# Patient Record
Sex: MALE | Race: BLACK OR AFRICAN AMERICAN | Employment: PART TIME | URBAN - METROPOLITAN AREA
[De-identification: names, ages, dates, MRNs, and addresses within clinical notes are randomized per-mention and may not be internally consistent; named-entity substitution may affect disease eponyms.]

---

## 2017-08-02 ENCOUNTER — OFFICE VISIT (OUTPATIENT)
Dept: FAMILY MEDICINE CLINIC | Age: 37
End: 2017-08-02

## 2017-08-02 ENCOUNTER — HOSPITAL ENCOUNTER (OUTPATIENT)
Dept: LAB | Age: 37
Discharge: HOME | End: 2017-08-02

## 2017-08-02 VITALS
TEMPERATURE: 98.2 F | WEIGHT: 315 LBS | DIASTOLIC BLOOD PRESSURE: 97 MMHG | BODY MASS INDEX: 44.1 KG/M2 | OXYGEN SATURATION: 97 % | SYSTOLIC BLOOD PRESSURE: 141 MMHG | HEIGHT: 71 IN | RESPIRATION RATE: 20 BRPM | HEART RATE: 93 BPM

## 2017-08-02 DIAGNOSIS — R74.8 ELEVATED SERUM ALKALINE PHOSPHATASE LEVEL: ICD-10-CM

## 2017-08-02 DIAGNOSIS — Z76.89 ENCOUNTER TO ESTABLISH CARE: ICD-10-CM

## 2017-08-02 DIAGNOSIS — Z76.89 ENCOUNTER TO ESTABLISH CARE: Primary | ICD-10-CM

## 2017-08-02 DIAGNOSIS — K21.9 GASTROESOPHAGEAL REFLUX DISEASE WITHOUT ESOPHAGITIS: ICD-10-CM

## 2017-08-02 DIAGNOSIS — E11.9 TYPE 2 DIABETES MELLITUS TREATED WITHOUT INSULIN (HCC): ICD-10-CM

## 2017-08-02 DIAGNOSIS — E78.1 HYPERTRIGLYCERIDEMIA: ICD-10-CM

## 2017-08-02 DIAGNOSIS — I10 HYPERTENSION WITH GOAL BLOOD PRESSURE LESS THAN 130/80: ICD-10-CM

## 2017-08-02 DIAGNOSIS — Z13.9 SCREENING PROCEDURE: ICD-10-CM

## 2017-08-02 DIAGNOSIS — E78.5 DYSLIPIDEMIA, GOAL LDL BELOW 70: ICD-10-CM

## 2017-08-02 DIAGNOSIS — Z78.9 ALCOHOL USE: ICD-10-CM

## 2017-08-02 DIAGNOSIS — G43.111 INTRACTABLE MIGRAINE WITH AURA WITH STATUS MIGRAINOSUS: ICD-10-CM

## 2017-08-02 LAB
ALBUMIN SERPL BCP-MCNC: 3.5 G/DL (ref 3.4–5)
ALBUMIN/GLOB SERPL: 0.8 {RATIO} (ref 0.8–1.7)
ALP SERPL-CCNC: 179 U/L (ref 45–117)
ALT SERPL-CCNC: 35 U/L (ref 16–61)
AMPHET UR QL SCN: NEGATIVE
ANION GAP BLD CALC-SCNC: 5 MMOL/L (ref 3–18)
AST SERPL W P-5'-P-CCNC: 21 U/L (ref 15–37)
BARBITURATES UR QL SCN: NEGATIVE
BASOPHILS # BLD AUTO: 0.1 K/UL (ref 0–0.06)
BASOPHILS # BLD: 1 % (ref 0–2)
BENZODIAZ UR QL: NEGATIVE
BILIRUB SERPL-MCNC: 0.3 MG/DL (ref 0.2–1)
BUN SERPL-MCNC: 11 MG/DL (ref 7–18)
BUN/CREAT SERPL: 10 (ref 12–20)
C TRACH RRNA SPEC QL NAA+PROBE: NEGATIVE
CALCIUM SERPL-MCNC: 8.9 MG/DL (ref 8.5–10.1)
CANNABINOIDS UR QL SCN: NEGATIVE
CHLORIDE SERPL-SCNC: 100 MMOL/L (ref 100–108)
CHOLEST SERPL-MCNC: 187 MG/DL
CO2 SERPL-SCNC: 31 MMOL/L (ref 21–32)
COCAINE UR QL SCN: NEGATIVE
CREAT SERPL-MCNC: 1.07 MG/DL (ref 0.6–1.3)
DIFFERENTIAL METHOD BLD: ABNORMAL
EOSINOPHIL # BLD: 0.1 K/UL (ref 0–0.4)
EOSINOPHIL NFR BLD: 1 % (ref 0–5)
ERYTHROCYTE [DISTWIDTH] IN BLOOD BY AUTOMATED COUNT: 12.4 % (ref 11.6–14.5)
EST. AVERAGE GLUCOSE BLD GHB EST-MCNC: 194 MG/DL
ETHANOL SERPL-MCNC: <3 MG/DL (ref 0–3)
GLOBULIN SER CALC-MCNC: 4.2 G/DL (ref 2–4)
GLUCOSE SERPL-MCNC: 273 MG/DL (ref 74–99)
HAV IGM SERPL QL IA: NEGATIVE
HBA1C MFR BLD: 8.4 % (ref 4.2–5.6)
HBV CORE IGM SER QL: NEGATIVE
HBV SURFACE AG SER QL: <0.1 INDEX
HBV SURFACE AG SER QL: NEGATIVE
HCT VFR BLD AUTO: 43.7 % (ref 36–48)
HCV AB SER IA-ACNC: 0.84 INDEX
HCV AB SERPL QL IA: ABNORMAL
HCV COMMENT,HCGAC: ABNORMAL
HDLC SERPL-MCNC: 48 MG/DL (ref 40–60)
HDLC SERPL: 3.9 {RATIO} (ref 0–5)
HDSCOM,HDSCOM: NORMAL
HGB BLD-MCNC: 14.2 G/DL (ref 13–16)
LDLC SERPL CALC-MCNC: 102 MG/DL (ref 0–100)
LIPID PROFILE,FLP: ABNORMAL
LYMPHOCYTES # BLD AUTO: 33 % (ref 21–52)
LYMPHOCYTES # BLD: 3.7 K/UL (ref 0.9–3.6)
MAGNESIUM SERPL-MCNC: 1.9 MG/DL (ref 1.6–2.6)
MCH RBC QN AUTO: 28.5 PG (ref 24–34)
MCHC RBC AUTO-ENTMCNC: 32.5 G/DL (ref 31–37)
MCV RBC AUTO: 87.6 FL (ref 74–97)
METHADONE UR QL: NEGATIVE
MONOCYTES # BLD: 0.7 K/UL (ref 0.05–1.2)
MONOCYTES NFR BLD AUTO: 6 % (ref 3–10)
N GONORRHOEA RRNA SPEC QL NAA+PROBE: NEGATIVE
NEUTS SEG # BLD: 6.9 K/UL (ref 1.8–8)
NEUTS SEG NFR BLD AUTO: 59 % (ref 40–73)
OPIATES UR QL: NEGATIVE
PCP UR QL: NEGATIVE
PLATELET # BLD AUTO: 270 K/UL (ref 135–420)
PMV BLD AUTO: 11.1 FL (ref 9.2–11.8)
POTASSIUM SERPL-SCNC: 4.1 MMOL/L (ref 3.5–5.5)
PROT SERPL-MCNC: 7.7 G/DL (ref 6.4–8.2)
RBC # BLD AUTO: 4.99 M/UL (ref 4.7–5.5)
RPR SER QL: NONREACTIVE
SODIUM SERPL-SCNC: 136 MMOL/L (ref 136–145)
SP1: ABNORMAL
SP2: ABNORMAL
SP3: ABNORMAL
SPECIMEN SOURCE: NORMAL
T3FREE SERPL-MCNC: 3.3 PG/ML (ref 2.3–4.2)
T4 FREE SERPL-MCNC: 1 NG/DL (ref 0.7–1.5)
TRIGL SERPL-MCNC: 185 MG/DL (ref ?–150)
TSH SERPL DL<=0.05 MIU/L-ACNC: 3.08 UIU/ML (ref 0.36–3.74)
VLDLC SERPL CALC-MCNC: 37 MG/DL
WBC # BLD AUTO: 11.4 K/UL (ref 4.6–13.2)

## 2017-08-02 PROCEDURE — 80053 COMPREHEN METABOLIC PANEL: CPT | Performed by: NURSE PRACTITIONER

## 2017-08-02 PROCEDURE — 80061 LIPID PANEL: CPT | Performed by: NURSE PRACTITIONER

## 2017-08-02 PROCEDURE — 80074 ACUTE HEPATITIS PANEL: CPT | Performed by: NURSE PRACTITIONER

## 2017-08-02 PROCEDURE — 80307 DRUG TEST PRSMV CHEM ANLYZR: CPT | Performed by: NURSE PRACTITIONER

## 2017-08-02 PROCEDURE — 84439 ASSAY OF FREE THYROXINE: CPT | Performed by: NURSE PRACTITIONER

## 2017-08-02 PROCEDURE — 84481 FREE ASSAY (FT-3): CPT | Performed by: NURSE PRACTITIONER

## 2017-08-02 PROCEDURE — 83036 HEMOGLOBIN GLYCOSYLATED A1C: CPT | Performed by: NURSE PRACTITIONER

## 2017-08-02 PROCEDURE — 87491 CHLMYD TRACH DNA AMP PROBE: CPT | Performed by: NURSE PRACTITIONER

## 2017-08-02 PROCEDURE — 86592 SYPHILIS TEST NON-TREP QUAL: CPT | Performed by: NURSE PRACTITIONER

## 2017-08-02 PROCEDURE — 85025 COMPLETE CBC W/AUTO DIFF WBC: CPT | Performed by: NURSE PRACTITIONER

## 2017-08-02 PROCEDURE — 84443 ASSAY THYROID STIM HORMONE: CPT | Performed by: NURSE PRACTITIONER

## 2017-08-02 PROCEDURE — 83735 ASSAY OF MAGNESIUM: CPT | Performed by: NURSE PRACTITIONER

## 2017-08-02 RX ORDER — LISINOPRIL AND HYDROCHLOROTHIAZIDE 12.5; 2 MG/1; MG/1
1 TABLET ORAL DAILY
Qty: 30 TAB | Refills: 1 | Status: SHIPPED | OUTPATIENT
Start: 2017-08-02 | End: 2017-10-16 | Stop reason: SDUPTHER

## 2017-08-02 RX ORDER — SIMVASTATIN 20 MG/1
TABLET, FILM COATED ORAL
COMMUNITY
End: 2017-08-14 | Stop reason: ALTCHOICE

## 2017-08-02 RX ORDER — FUROSEMIDE 20 MG/1
TABLET ORAL DAILY
COMMUNITY
End: 2017-08-02 | Stop reason: ALTCHOICE

## 2017-08-02 RX ORDER — AMITRIPTYLINE HYDROCHLORIDE 25 MG/1
25 TABLET, FILM COATED ORAL
Qty: 30 TAB | Refills: 3 | Status: SHIPPED | OUTPATIENT
Start: 2017-08-02 | End: 2017-12-04 | Stop reason: SDUPTHER

## 2017-08-02 RX ORDER — SPIRONOLACTONE 25 MG/1
25 TABLET ORAL 2 TIMES DAILY
COMMUNITY
End: 2017-08-02 | Stop reason: ALTCHOICE

## 2017-08-02 RX ORDER — DEXLANSOPRAZOLE 30 MG/1
30 CAPSULE, DELAYED RELEASE ORAL DAILY
Qty: 90 CAP | Refills: 0 | Status: SHIPPED | COMMUNITY
Start: 2017-08-02 | End: 2017-08-10 | Stop reason: RX

## 2017-08-02 NOTE — PATIENT INSTRUCTIONS
The [facility] reminders! Foundation Operating Hours: These may change without notice. Mon- Wed 7am to 5pm. Closed for lunch 12-1pm  Thurs 7am to 12pm  Fridays closed     Office number:     **In case of inclement weather (snow and ice) please do not try to come into the office for your appointment. Please call in and you will not be held as a [employer]. \" SAFETY FIRST!!**    NO SHOW POLICY ~ If a patient has 3 no shows for an appointment with the Provider, Mental Health Provider, or the Nurse Navigator, they will be discharged from the practice for 6 months. Medication ordering will also be suspended. If the patient is discharged from the [facility], they can go to the [place] [address] where they can be seen for their primary needs plus obtain the same type of medications as they received at the [facility]. To avoid being discharged the patient must call the office at [phone] 24 hours prior to their appointment if they need to cancel or reschedule, arrive to their appointments on time (preferrably 15 minutes early) and come to all scheduled appointments with the provider, mental health, and/or nurse navigator. If the patient is discharged from the practice, they can apply to be re-established after 6 months. Lab work:    Unless you are instructed differently, please return to the office between the hours of 7 am and 10:30 am Monday through Thursday to have your labs drawn one week before your next scheduled PROVIDER appointment. If you do not have an appointment to follow up on these results, please make one or plan to call the office if you do not hear from us to get the results. No news does not mean good news. Medications: [facility] or C will assist you with your medications when available.  Not all medications are available and may be obtained through local pharmacies such as [place] [address] that has a large $4 list.     If your medications are new or have changed, and you get your medications from the [address] [facility]), you MUST talk to the pharmacy staff to sign the new prescription applications. If you don't sign the applications we cannot get the medications for you. It usually takes 6-8 weeks for your medications to arrive. The Pharmacy staff will call you when your medications are available. You will have 30 days to come in and  your medications. If you don't  your medicines within those 30 days, those medicines may be placed on the self as samples and you will have to start all over again by completing the applications and waiting the 6-8 weeks for your medicines to arrive. Foot Care: Local ministry through [facility] ([address] [facility])  Every second Tuesday of the month (except for holidays and election days) from 9am to 1 pm. The services provided by these ministry volunteers are free of charge with the option to donate. They will inspect your feet thoroughly, soak them for 10 minutes, cut and file your nails. They care for diabetics as well. Keep in mind this service is free and will be on a first come first serve basis. Bad teeth? Ask about the Dental Clinic to get you in front of a local dentist when a dentist is available. The bus leaves every other Wednesday for those on the list. (Ask about availability as these appointments are limited). DIABETES:   Do you have uncontrolled diabetes or you just want to learn more about your diabetes? Schedule with the Nurse navigator for our new 5-week Diabetes program. You will learn how to properly manage your diabetes: nutrition, exercise, medication therapy. Eye exams for Diabetics. Please let us know so we can add you to the list to see the eye doctor at [facility]. These appointments are limited. You will receive a free eye exam and free glasses if needed. Unfortunately, if you are not a diabetic, we do not have a free service for eye exams for you (yet!).   We do have information on where to go to get a huge discount on eye exams and glasses. Sick visits: If you are sick and it is not an emergency call the office to schedule an appointment to see the provider. Charges and cost items from the Foundation:    Most of our orders are covered by [address] but there ARE SOME CHARGES for items such as radiology interpretations and anesthesiology during procedures and surgeries that are not covered under [facility]. Advanced Patient Advocacy (APA)   Please make sure you have contacted the APA group to check on your payment options: www. [email or web address]. APA is available Mon - Fri 8-4pm at [facility] on the first floor by the information desk. Their number is [phone]. It is important that you are screened in order to qualify for assistance and to avoid huge medical charges. The [facility] is not responsible for ANY charges you may accrue regardless of who ordered the medication, procedure, treatment or test. If you go to the Emergency Room, you WILL be charged! Behavior and emotional issues! It is stressful to be sick, have an illness, take medications, not have a job, not have medical insurance, have family issues or just getting older! Schedule an appointment with our mental health provider. She is in the office Mondays and Wednesdays from 8am to [address] [facility] can also contact the following: The national suicide hotline (6-551-370-IE or 6-863.689.6958)    [address], [address] [place] [address]) - [address] [place] [address], [facility] Shavonne Leone  [phone]    Drug and Alcohol Addiction Issues! It is hard to stop a poor habit but there is help out there.  Please feel free to attend any other the following support groups to help you kick the habit or go to [facility] to be evaluated by the psychiatric team. Never give up!! George meetings: Darnell Albarran, [address] 10:30 AM [place] [address] [facility] 8:00  [address] [facility]                Gastroesophageal Reflux Disease (GERD): Care Instructions  Your Care Instructions    Gastroesophageal reflux disease (GERD) is the backward flow of stomach acid into the esophagus. The esophagus is the tube that leads from your throat to your stomach. A one-way valve prevents the stomach acid from moving up into this tube. When you have GERD, this valve does not close tightly enough. If you have mild GERD symptoms including heartburn, you may be able to control the problem with antacids or over-the-counter medicine. Changing your diet, losing weight, and making other lifestyle changes can also help reduce symptoms. Follow-up care is a key part of your treatment and safety. Be sure to make and go to all appointments, and call your doctor if you are having problems. Its also a good idea to know your test results and keep a list of the medicines you take. How can you care for yourself at home? · Take your medicines exactly as prescribed. Call your doctor if you think you are having a problem with your medicine. · Your doctor may recommend over-the-counter medicine. For mild or occasional indigestion, antacids, such as Tums, Gaviscon, Mylanta, or Maalox, may help. Your doctor also may recommend over-the-counter acid reducers, such as Pepcid AC, Tagamet HB, Zantac 75, or Prilosec. Read and follow all instructions on the label. If you use these medicines often, talk with your doctor. · Change your eating habits. ¨ Its best to eat several small meals instead of two or three large meals. ¨ After you eat, wait 2 to 3 hours before you lie down. ¨ Chocolate, mint, and alcohol can make GERD worse.   ¨ Spicy foods, foods that have a lot of acid (like tomatoes and oranges), and coffee can make GERD symptoms worse in some people. If your symptoms are worse after you eat a certain food, you may want to stop eating that food to see if your symptoms get better. · Do not smoke or chew tobacco. Smoking can make GERD worse. If you need help quitting, talk to your doctor about stop-smoking programs and medicines. These can increase your chances of quitting for good. · If you have GERD symptoms at night, raise the head of your bed 6 to 8 inches by putting the frame on blocks or placing a foam wedge under the head of your mattress. (Adding extra pillows does not work.)  · Do not wear tight clothing around your middle. · Lose weight if you need to. Losing just 5 to 10 pounds can help. When should you call for help? Call your doctor now or seek immediate medical care if:  · You have new or different belly pain. · Your stools are black and tarlike or have streaks of blood. Watch closely for changes in your health, and be sure to contact your doctor if:  · Your symptoms have not improved after 2 days. · Food seems to catch in your throat or chest.  Where can you learn more? Go to http://judith-alex.info/. Enter O528 in the search box to learn more about \"Gastroesophageal Reflux Disease (GERD): Care Instructions. \"  Current as of: August 9, 2016  Content Version: 11.3  © 5040-3439 [employer]. Care instructions adapted under license by [employer] (which disclaims liability or warranty for this information). If you have questions about a medical condition or this instruction, always ask your healthcare professional. [place] [address] any warranty or liability for your use of this information. Dexlansoprazole (By mouth)   Dexlansoprazole (dex-lewis-REY-pra-zole)  Treats heartburn, gastroesophageal reflux disease (GERD), or a damaged esophagus. This medicine is a proton pump inhibitor (PPI).    Brand Name(s): Dexilant   There may be other brand names for this medicine. When This Medicine Should Not Be Used: This medicine is not right for everyone. Do not use it if you had an allergic reaction to dexlansoprazole. How to Use This Medicine:   Delayed Release Capsule, Tablet Disintegrating, Delayed Release  · Take this medicine as directed. You may need to take it for several weeks before you feel better. · Delayed-release capsule:   ¨ Swallow it whole. If you cannot swallow the capsule whole, you may open it and pour the medicine into a tablespoon of applesauce. Swallow the mixture right away without chewing. Do not store the mixed medicine for later use. ¨ If capsule is given through an oral syringe:   § Open the capsule and mix the contents with 20 milliliters (mL) of water. § Use an oral syringe to draw up all of the mixture and swirl gently. § Give the mixture right away. Do not chew. § Refill the syringe with 10 mL of water. Swirl gently. Swallow the water. § Refill the syringe with another 10 mL of water and swallow it right away. This will make sure you get your whole dose. ¨ If capsule is given through a feeding tube:   § Open the capsule and combine the medicine with 20 milliliters (mL) of water. § Use a catheter-tip syringe to draw up the mixture and swirl gently. § Inject the medicine into the feeding tube right away. § Refill the syringe with 10 mL of water. Swirl it gently. Inject the water into the tube. § Refill with another 10 mL of water and inject this into the tube. This will make sure the full dose is given. · Delayed-release disintegrating tablet:   ¨ Take this medicine at least 30 minutes before a meal.  ¨ Do not break or cut the tablet. ¨ Place the tablet on the tongue, allow to dissolve, and swallow it without water. Do not chew the microgranules. Or, you may swallow the tablet whole with water.   ¨ If tablet is given through an oral syringe:   § Place one tablet in an oral syringe and draw up 20 milliliters (mL) of water. § Swirl the syringe gently to keep the granules from settling. § Give the mixture directly into the mouth right away. Do not store the mixed medicine for later use. § To rinse any leftover medicine in the syringe, refill the syringe with 10 mL of water, swirl gently and swallow the water. § Repeat with an additional 10 mL of water. ¨ If tablet is given through a feeding tube:   § Place one tablet in a catheter-tip syringe and draw up 20 milliliters (mL) of water. § Shake gently to keep the granules from settling, and inject the medicine into the NG tube right away. § Refill the syringe with the 10 mL of water. § Shake it gently, and inject it into the tube to rinse any leftover medicine through the tube. § Repeat with an additional 10 mL of water. · This medicine should come with a Medication Guide. Ask your pharmacist for a copy if you do not have one. · Missed dose: Take a dose as soon as you remember. If it is almost time for your next dose, wait until then and take a regular dose. Do not take extra medicine to make up for a missed dose. · Store the medicine in a closed container at room temperature, away from heat, moisture, and direct light. Drugs and Foods to Avoid:   Ask your doctor or pharmacist before using any other medicine, including over-the-counter medicines, vitamins, and herbal products. · Do not use this medicine if you are also using products containing rilpivirine. · Dexlansoprazole affects digestion and may keep other medicines from working correctly.  Tell your doctor if you are using any of the following:  ¨ Ampicillin, atazanavir, dasatinib, digoxin, erlotinib, itraconazole, ketoconazole, methotrexate, mycophenolate mofetil, nelfinavir, nilotinib, rifampin, ritonavir, saquinavir, Solomon's wort, tacrolimus, voriconazole  ¨ Blood thinner (including warfarin)  ¨ Diuretic (water pill)  ¨ Iron supplements  · Do not drink alcohol while you are using this medicine. Warnings While Using This Medicine:   · Tell your doctor if you are pregnant or breastfeeding, or if you have kidney disease, liver disease, lupus, or osteoporosis. · This medicine may cause the following problems:  ¨ Kidney problems  ¨ Low vitamin B12 or magnesium levels in the blood  ¨ Increased risk of broken bones in the hip, wrist, or spine  · This medicine can cause diarrhea. Call your doctor if the diarrhea becomes severe, does not stop, or is bloody. Do not take any medicine to stop diarrhea until you have talked to your doctor. Diarrhea can occur 2 months or more after you stop taking this medicine. · Tell any doctor or dentist who treats you that you are using this medicine. This medicine may affect certain medical test results. · Keep all medicine out of the reach of children. Never share your medicine with anyone. Possible Side Effects While Using This Medicine:   Call your doctor right away if you notice any of these side effects:  · Allergic reaction: Itching or hives, swelling in your face or hands, swelling or tingling in your mouth or throat, chest tightness, trouble breathing  · Blistering, peeling, or red skin rash  · Fever, joint pain, swelling in your body, unusual weight gain, changes in how much or how often you urinate  · Joint pain, rash on your cheeks or arms that gets worse in the sun  · Seizures, dizziness, fast or uneven heartbeat, muscle cramps or twitching  · Severe diarrhea that does not go away, stomach cramps, fever  If you notice other side effects that you think are caused by this medicine, tell your doctor. Call your doctor for medical advice about side effects. You may report side effects to FDA at 8-320-FDA-2718  © 2017 [facility] Information is for End User's use only and may not be sold, redistributed or otherwise used for commercial purposes. The above information is an  only.  It is not intended as medical advice for individual conditions or treatments. Talk to your doctor, nurse or pharmacist before following any medical regimen to see if it is safe and effective for you.

## 2017-08-02 NOTE — PROGRESS NOTES
[facility]  [address] [facility]  [phone] office/[phone] fax      8/2/2017    Reason for visit:   Chief Complaint   Patient presents with    Establish Care    Hypertension     On aldactone and lasix    GERD     was on prilosec    Cholesterol Problem     on simvastatin       Patient: Marysol Westbrook, 1980, [id number]       Primary MD: Mimi Easton NP    Subjective:   Marysol Westbrook, a 39 y.o. male, who is right handed presents for Establish Care; Hypertension (On aldactone and lasix); GERD (was on prilosec); and Cholesterol Problem (on simvastatin)    I was released last month after being incarcerated for 3 years     Establish Care   The history is provided by the patient Helen Hammond MD for 3 years). Associated symptoms include headaches. Pertinent negatives include no chest pain and no shortness of breath. Hypertension    The history is provided by the patient (I took lisinopril/HCTZ for years and it worked. ). Chronicity: dx age 14 and startedmeds age 22. Episode onset: I love salt. I use light salt. I love potato chips. Associated symptoms include headaches. Pertinent negatives include no chest pain, no palpitations and no shortness of breath. GERD   The history is provided by the patient. Chronicity: Dx 10 yrs ago. taking prilosec I like Hot sauce , peppers. Associated symptoms include headaches. Pertinent negatives include no chest pain and no shortness of breath. Cholesterol Problem   The history is provided by the patient (simvastatin now). Associated symptoms include headaches. Pertinent negatives include no chest pain and no shortness of breath. Medication Evaluation   The history is provided by the patient (was on Depakote that made me swell, then he added aldactone on top of prinzide/HCTZ. currently taking spirolactolone,topiramate,simvastatin,furosemide,omeprazole). Associated symptoms include headaches.  Pertinent negatives include no chest pain and no shortness of breath. Headache   The history is provided by the patient (migraines almost everyday. I was taking Topamax- so expensive. I cant afford. Shagufta Alida ). Associated symptoms include headaches. Pertinent negatives include no chest pain and no shortness of breath. Past Medical History:   Diagnosis Date    GERD (gastroesophageal reflux disease)     H/O seasonal allergies     Hypertension     Migraine        History reviewed. No pertinent surgical history. Social History     Social History    Marital status: SINGLE     Spouse name: N/A    Number of children: N/A    Years of education: 15     Occupational History    unemployed      Social History Main Topics    Smoking status: Former Smoker    Smokeless tobacco: Never Used      Comment: e- cigarette user    Alcohol use Yes      Comment: occasional    Drug use: No    Sexual activity: Yes     Partners: Female     Birth control/ protection: None     Other Topics Concern     Service No    Blood Transfusions No    Occupational Exposure No    Hobby Hazards No    Sleep Concern No    Weight Concern Yes    Exercise Yes     walking    Bike Helmet No    Seat Belt Yes    Self-Exams No     Social History Narrative    No narrative on file       No Known Allergies    No current outpatient prescriptions on file prior to visit. No current facility-administered medications on file prior to visit. Review of Systems   Constitutional: Negative. Eyes: Negative. Respiratory: Negative for cough, shortness of breath and wheezing. Smoke e-cigs. Cardiovascular: Negative for chest pain, palpitations and leg swelling. Gastrointestinal:        I get reflux at times. I take prilosec as needed   Endocrine: Negative. Genitourinary:        I kept getting UTI x3 while incarcerated. Musculoskeletal: Negative. Skin: Negative. Allergic/Immunologic: Negative.     Neurological: Positive for headaches. Hematological: Negative. Psychiatric/Behavioral:        No illicit drugs. I drink 2-3 beers twice a week. Objective:   Visit Vitals    BP (!) 141/97 (BP 1 Location: Left arm, BP Patient Position: Sitting)  Comment: took BP med 1.5 hr ago    Pulse 93    Temp 98.2 °F (36.8 °C) (Oral)    Resp 20    Ht 5' 11\" (1.803 m)    Wt 320 lb (145.2 kg)    SpO2 97%    BMI 44.63 kg/m2      Wt Readings from Last 3 Encounters:   08/02/17 320 lb (145.2 kg)     No results found for: GLU, GLUCPOC      Physical Exam      Assessment:    Dimitrios Keen who has risk factors including (see above previous medical hx) and:       ICD-10-CM ICD-9-CM    1. Encounter to establish care Z76.89 V65.8 CBC WITH AUTOMATED DIFF      CHLAMYDIA/NEISSERIA AMPLIFICATION      DRUG SCREEN, URINE      ETHYL ALCOHOL      HEMOGLOBIN A1C WITH EAG      HEPATITIS PANEL, ACUTE      LIPID PANEL      TSH 3RD GENERATION      T4, FREE      T3, FREE      RPR      METABOLIC PANEL, COMPREHENSIVE      MAGNESIUM   2. Hypertension with goal blood pressure less than 130/80 I10 401.9 lisinopril-hydroCHLOROthiazide (PRINZIDE, ZESTORETIC) 20-12.5 mg per tablet   3. Gastroesophageal reflux disease without esophagitis K21.9 530.81 dexlansoprazole (DEXILANT) 30 mg capsule   4. Intractable migraine with aura with status migrainosus G43. 111 346.03 amitriptyline (ELAVIL) 25 mg tablet     1. Encounter to establish care    - CBC WITH AUTOMATED DIFF; Future  - CHLAMYDIA/NEISSERIA AMPLIFICATION; Future  - DRUG SCREEN, URINE; Future  - ETHYL ALCOHOL; Future  - HEMOGLOBIN A1C WITH EAG; Future  - HEPATITIS PANEL, ACUTE; Future  - LIPID PANEL; Future  - TSH 3RD GENERATION; Future  - T4, FREE; Future  - T3, FREE; Future  - RPR; Future  - METABOLIC PANEL, COMPREHENSIVE; Future  - MAGNESIUM; Future    2. Hypertension with goal blood pressure less than 130/80  See dyslipidemia section.     - lisinopril-hydroCHLOROthiazide (PRINZIDE, ZESTORETIC) 20-12.5 mg per tablet; Take 1 Tab by mouth daily. Dispense: 30 Tab; Refill: 1    3. Dyslipidemia, goal LDL below 70  May add caduet tomed regimen for BP and cholesterol if appropriate. If caduet is appropriate then pt will dc zocor. NN to check BP at follow up appt and let me know results. 4. Hypertriglyceridemia    - omega-3 acid ethyl esters (LOVAZA) 1 gram capsule; Take 2 Caps by mouth two (2) times a day. For triglycerides  Dispense: 360 Cap; Refill: 3  - omega-3 acid ethyl esters (LOVAZA) 1 gram capsule; Take 2 Caps by mouth two (2) times a day. For triglycerides  Dispense: 360 Cap; Refill: 0    5. Type 2 diabetes mellitus treated without insulin (HCC)  Newly dx DM. Pt to set up with NN for 5 week DM program.     - sAXagliptin-metFORMIN (KOMBIGLYZE XR) 2.5-1,000 mg TM24; Take 1 Tab by mouth daily. For diabetes  Dispense: 90 Tab; Refill: 0  - sAXagliptin-metFORMIN (KOMBIGLYZE XR) 2.5-1,000 mg TM24; Take 1 Tab by mouth daily. For diabetes  Dispense: 90 Tab; Refill: 3  - REFERRAL TO OPTOMETRY    6. Gastroesophageal reflux disease without esophagitis  Instructed pt to avoid foods that may stir up the reflux such as hot, spicy foods. Avoid laying down for 45 minutes after eating a meal.  Decrease the caffeine, acidic foods/drinks, elimination of alcohol and tobacco products. Discussed GERD treatment and the goal of it being a temporary measure while the patient improves their diet due to the risk of persistent GERD causing Munoz's esophagus which is precancerous and can cause osteoporosis. Patient verbalized understanding and will work on diet. This medication is provided on a temporary basis and advised of the risk of low magnesium which may cause muscle cramping and risk of osteoporosis and hypokalemia. - dexlansoprazole (DEXILANT) 30 mg capsule; Take 1 Cap by mouth daily. Dispense: 90 Cap; Refill: 0    7. Intractable migraine with aura with status migrainosus  Pt unable to afford topamax.  Elavil effective in the past.     - amitriptyline (ELAVIL) 25 mg tablet; Take 1 Tab by mouth nightly. For migraine prevention  Dispense: 30 Tab; Refill: 3    8. BMI 40.0-44.9, adult ([address]      9. Screening procedure  Pts Hep C panel came back as equivocal. Will obtain PCR.     - HEPATITIS C QT BY PCR WITH REFLEX GENOTYPE; Future    10. Elevated serum alkaline phosphatase level  Possibly alcohol induced    11. Alcohol use      Written instructions followed our verbal discussion of all information discussed above, pending tests ordered and future goals/plans. Patient expressed understanding of current diagnosis, planned testing, follow up and if needed to contact the office for any questions or concerns prior to the next visit. Plan:   Reviewed medication and completed the medication reconciliation with the patient. Reviewed side effects of medications with the patient. Questions were answered and patient verb understanding. Labs obtained to establish baseline, evaluate metabolic health, nutritional status, vitamin deficiencies and screening for at risk items based on the demographics of the patient, previous medical history and current social practices.  Will contact the patient in when all labs are resulted by phone to review and make lifestyle and medication recommendations. Follow up labs will be completed to monitor improvement prior to their next visit. LAB UPDATE:   Karen,  Please notify this new patient of the following lab results: Appt 8/9/17  1) Hep C is equivocal. Pt needs to have Hep with pcr drawn. Please draw this lab when you see him for his appt  2) A1c 8.4. Will order Kombiglyze via [facility] as sample and as a program medication. Pt needs to come into the office to sign [facility] paperwork and to  samples if available. Pt needs to schedule with you for the 5 week DM program as well. 3) . Will order Lovaza via [facility] as sample and as a program medication.  Pt needs to come into the office to sign [facility] paperwork and to  samples if available; . Let me know what pts BP is when you check it. May add caduet to him for BP and cholesterol. If caduet is appropriate then pt will dc zocor   4) Alk phos slightly elevated. Encourage pt to cut back on alcohol    Please inform patient they will need labs at 3 follow up appt    Orders Placed This Encounter    CBC WITH AUTOMATED DIFF     Standing Status:   Future     Standing Expiration Date:   8/2/2017    CHLAMYDIA/NEISSERIA AMPLIFICATION     Standing Status:   Future     Standing Expiration Date:   8/2/2017     Order Specific Question:   Specimen type/source     Answer:   Urine [258]    DRUG SCREEN, URINE     Standing Status:   Future     Standing Expiration Date:   8/2/2017    ETHYL ALCOHOL     Standing Status:   Future     Standing Expiration Date:   8/2/2017    HEMOGLOBIN A1C WITH EAG     Standing Status:   Future     Standing Expiration Date:   8/2/2017    HEPATITIS PANEL, ACUTE     Standing Status:   Future     Standing Expiration Date:   8/2/2017    LIPID PANEL     Standing Status:   Future     Standing Expiration Date:   8/2/2017    TSH 3RD GENERATION     Standing Status:   Future     Standing Expiration Date:   8/2/2017    T4, FREE     Standing Status:   Future     Standing Expiration Date:   8/2/2017    T3, FREE     Standing Status:   Future     Standing Expiration Date:   8/2/2017    RPR     Standing Status:   Future     Standing Expiration Date:   3/4/7374    METABOLIC PANEL, COMPREHENSIVE     Standing Status:   Future     Standing Expiration Date:   8/2/2017    MAGNESIUM     Standing Status:   Future     Standing Expiration Date:   8/2/2017    DISCONTD: furosemide (LASIX) 20 mg tablet     Sig: Take  by mouth daily.  DISCONTD: spironolactone (ALDACTONE) 25 mg tablet     Sig: Take 25 mg by mouth two (2) times a day.  simvastatin (ZOCOR) 20 mg tablet     Sig: Take  by mouth nightly.     lisinopril-hydroCHLOROthiazide (Aura Job) 20-12.5 mg per tablet     Sig: Take 1 Tab by mouth daily. Dispense:  30 Tab     Refill:  1    dexlansoprazole (DEXILANT) 30 mg capsule     Sig: Take 1 Cap by mouth daily. Dispense:  90 Cap     Refill:  0    amitriptyline (ELAVIL) 25 mg tablet     Sig: Take 1 Tab by mouth nightly. For migraine prevention     Dispense:  30 Tab     Refill:  3     Current Outpatient Prescriptions   Medication Sig Dispense Refill    simvastatin (ZOCOR) 20 mg tablet Take  by mouth nightly.  lisinopril-hydroCHLOROthiazide (PRINZIDE, ZESTORETIC) 20-12.5 mg per tablet Take 1 Tab by mouth daily. 30 Tab 1    dexlansoprazole (DEXILANT) 30 mg capsule Take 1 Cap by mouth daily. 90 Cap 0    amitriptyline (ELAVIL) 25 mg tablet Take 1 Tab by mouth nightly. For migraine prevention 30 Tab 3       Follow-up Disposition:  Return in about 3 months (around 11/2/2017). Call [facility] for financial assistance      \"No Show policy was reviewed with the patient. The services affected are the nurse navigator and the provider. No show appointments include missing labs for a future scheduled appointment, Pap/pelvics, arriving to appointment more than 10 minutes late, and calling to cancel appointment less than 24 hours in advance. After the 3rd No Show, the patient will be removed from the Foundation to include medications for 6 months. The patient will be referred to the [place] [id number] for their primary care needs. \"     pt to schedule an appt in 1 week with NN to discuss expectations, health issues, and lab review. Yisel Whipple RN, MSN, [employer]       spent 60 minutes with the patient in face-to-face consultation, of which greater than 50% was spent in counseling and coordination of care as described above.

## 2017-08-02 NOTE — MR AVS SNAPSHOT
Visit Information Date & Time Provider Department Dept. Phone Encounter #  
 8/2/2017  9:00 AM Bill Rose NP [address] [facility] [address] [id number] Follow-up Instructions Return in about 3 months (around 11/2/2017). Your Appointments 8/9/2017  9:30 AM  
CONSULT with NURSE NAVIGATOR [facility] [address] [facility] ([address]) Appt Note: NPO/BP check see note [address] [facility] [address]  
[address] [facility] [address]  
  
    
 11/8/2017  1:00 PM  
Follow Up with Bill Rose NP [address] [facility] ([address]) Appt Note: 3 mth follow up  
 [address] [facility] [address]  
[address] [facility] [address] Upcoming Health Maintenance Date Due DTaP/Tdap/Td series (1 - Tdap) 12/13/2000 INFLUENZA AGE 9 TO ADULT 8/1/2017 Allergies as of 8/2/2017  Review Complete On: 8/2/2017 By: Shayla Mooney. Yeimy Wheeler LPN No Known Allergies Current Immunizations  Never Reviewed No immunizations on file. Not reviewed this visit You Were Diagnosed With   
  
 Codes Comments Encounter to establish care    -  Primary ICD-10-CM: Z76.89 
ICD-9-CM: V65.8 Hypertension with goal blood pressure less than 130/80     ICD-10-CM: I10 
ICD-9-CM: 401.9 Gastroesophageal reflux disease without esophagitis     ICD-10-CM: K21.9 ICD-9-CM: 530.81 Intractable migraine with aura with status migrainosus     ICD-10-CM: G43.111 ICD-9-CM: 346.03 Vitals BP Pulse Temp Resp Height(growth percentile) Weight(growth percentile) (!) 141/97 (BP 1 Location: Left arm, BP Patient Position: Sitting) 93 98.2 °F (36.8 °C) (Oral) 20 5' 11\" (1.803 m) 320 lb (145.2 kg) SpO2 BMI Smoking Status 97% 44.63 kg/m2 Former Smoker BMI and BSA Data Body Mass Index Body Surface Area  
 44.63 kg/m 2 2.7 m 2 Preferred Pharmacy Pharmacy Name Phone [facility] Marli Doran [address]. [phone] Your Updated Medication List  
  
   
This list is accurate as of: 8/2/17  9:28 AM.  Always use your most recent med list.  
  
  
  
  
 amitriptyline 25 mg tablet Commonly known as:  ELAVIL Take 1 Tab by mouth nightly. For migraine prevention  
  
 dexlansoprazole 30 mg capsule Commonly known as:  DEXILANT Take 1 Cap by mouth daily. lisinopril-hydroCHLOROthiazide 20-12.5 mg per tablet Commonly known as:  Marjean Amaya Take 1 Tab by mouth daily. simvastatin 20 mg tablet Commonly known as:  ZOCOR Take  by mouth nightly. Prescriptions Sent to Pharmacy Refills  
 lisinopril-hydroCHLOROthiazide (PRINZIDE, ZESTORETIC) 20-12.5 mg per tablet 1 Sig: Take 1 Tab by mouth daily. Class: Normal  
 Pharmacy: [address] [place] [address] Lakeisha Morel [address]. Ph #: [phone] Route: Oral  
 amitriptyline (ELAVIL) 25 mg tablet 3 Sig: Take 1 Tab by mouth nightly. For migraine prevention Class: Normal  
 Pharmacy: [address] [place] [address] Lakeisha Morel [address]. Ph #: [phone] Route: Oral  
  
Follow-up Instructions Return in about 3 months (around 11/2/2017). Patient Instructions [facility] reminders! Foundation Operating Hours: These may change without notice. Mon- Wed 7am to 5pm. Closed for lunch 12-1pm 
Thurs 7am to 12pm 
Fridays closed Office number:  **In case of inclement weather (snow and ice) please do not try to come into the office for your appointment. Please call in and you will not be held as a [employer] \" SAFETY FIRST!!** 
 
NO SHOW POLICY ~ If a patient has 3 no shows for an appointment with the Provider, Mental Health Provider, or the Nurse Navigator, they will be discharged from the practice for 6 months. Medication ordering will also be suspended. If the patient is discharged from the [place], they can go to the [place] [address] where they can be seen for their primary needs plus obtain the same type of medications as they received at the [place]. To avoid being discharged the patient must call the office at [phone] 24 hours prior to their appointment if they need to cancel or reschedule, arrive to their appointments on time (preferrably 15 minutes early) and come to all scheduled appointments with the provider, mental health, and/or nurse navigator. If the patient is discharged from the practice, they can apply to be re-established after 6 months. Lab work:   
Unless you are instructed differently, please return to the office between the hours of 7 am and 10:30 am Monday through Thursday to have your labs drawn one week before your next scheduled PROVIDER appointment. If you do not have an appointment to follow up on these results, please make one or plan to call the office if you do not hear from us to get the results. No news does not mean good news. Medications: [facility] or [facility] will assist you with your medications when available. Not all medications are available and may be obtained through local pharmacies such as [place] [address] that has a large $4 list.  
 
If your medications are new or have changed, and you get your medications from the [address] [place] [address]), you MUST talk to the pharmacy staff to sign the new prescription applications. If you don't sign the applications we cannot get the medications for you. It usually takes 6-8 weeks for your medications to arrive. The Pharmacy staff will call you when your medications are available. You will have 30 days to come in and  your medications.  If you don't  your medicines within those 30 days, those medicines may be placed on the self as samples and you will have to start all over again by completing the applications and waiting the 6-8 weeks for your medicines to arrive. Foot Care: [facility] through [facility] ([address]) Every second Tuesday of the month (except for holidays and election days) from 9am to 1 pm. The services provided by these ministry volunteers are free of charge with the option to donate. They will inspect your feet thoroughly, soak them for 10 minutes, cut and file your nails. They care for diabetics as well. Keep in mind this service is free and will be on a first come first serve basis. Bad teeth? Ask about the Dental Clinic to get you in front of a local dentist when a dentist is available. The bus leaves every other Wednesday for those on the list. (Ask about availability as these appointments are limited). DIABETES:  
Do you have uncontrolled diabetes or you just want to learn more about your diabetes? Schedule with the Nurse navigator for our new 5-week Diabetes program. You will learn how to properly manage your diabetes: nutrition, exercise, medication therapy. Eye exams for Diabetics. Please let us know so we can add you to the list to see the eye doctor at [facility]. These appointments are limited. You will receive a free eye exam and free glasses if needed. Unfortunately, if you are not a diabetic, we do not have a free service for eye exams for you (yet!). We do have information on where to go to get a huge discount on eye exams and glasses. Sick visits: If you are sick and it is not an emergency call the office to schedule an appointment to see the provider. Charges and cost items from the Foundation: Most of our orders are covered by [employer] but there ARE SOME CHARGES for items such as radiology interpretations and anesthesiology during procedures and surgeries that are not covered under [employer]. Advanced Patient Advocacy (APA) Please make sure you have contacted the APA group to check on your payment options: [email or web address]. APA is available Mon - Fri 8-4pm at [facility] on the first floor by the information desk. Their number is [phone]. It is important that you are screened in order to qualify for assistance and to avoid huge medical charges. The [facility] is not responsible for ANY charges you may accrue regardless of who ordered the medication, procedure, treatment or test. If you go to the Emergency Room, you WILL be charged! Behavior and emotional issues! It is stressful to be sick, have an illness, take medications, not have a job, not have medical insurance, have family issues or just getting older! Schedule an appointment with our mental health provider. She is in the office Mondays and Wednesdays from 8am to [place] [address] can also contact the following: The national suicide hotline (5-752-446-MIZP or 6-244.700.3012) [address] [place] [address] 
[phone] [facility] ([facility]) [facility] [address] [place] [address] [facility]  
[phone] Drug and Alcohol Addiction Issues! It is hard to stop a poor habit but there is help out there. Please feel free to attend any other the following support groups to help you kick the habit or go to [facility] Emergency Department to be evaluated by the psychiatric team. Never give up!! George meetings: Cathy Ojeda [place] MONDAY 10:30  Radha [address] [place] SATURDAY 8:00 PM RUTH [facility] SATURDAY NIGHT KHADRA Jose [address] Gastroesophageal Reflux Disease (GERD): Care Instructions Your Care Instructions Gastroesophageal reflux disease (GERD) is the backward flow of stomach acid into the esophagus. The esophagus is the tube that leads from your throat to your stomach. A one-way valve prevents the stomach acid from moving up into this tube. When you have GERD, this valve does not close tightly enough. If you have mild GERD symptoms including heartburn, you may be able to control the problem with antacids or over-the-counter medicine. Changing your diet, losing weight, and making other lifestyle changes can also help reduce symptoms. Follow-up care is a key part of your treatment and safety. Be sure to make and go to all appointments, and call your doctor if you are having problems. Its also a good idea to know your test results and keep a list of the medicines you take. How can you care for yourself at home? · Take your medicines exactly as prescribed. Call your doctor if you think you are having a problem with your medicine. · Your doctor may recommend over-the-counter medicine. For mild or occasional indigestion, antacids, such as Tums, Gaviscon, Mylanta, or Maalox, may help. Your doctor also may recommend over-the-counter acid reducers, such as Pepcid AC, Tagamet HB, Zantac 75, or Prilosec. Read and follow all instructions on the label. If you use these medicines often, talk with your doctor. · Change your eating habits. ¨ Its best to eat several small meals instead of two or three large meals. ¨ After you eat, wait 2 to 3 hours before you lie down. ¨ Chocolate, mint, and alcohol can make GERD worse. ¨ Spicy foods, foods that have a lot of acid (like tomatoes and oranges), and coffee can make GERD symptoms worse in some people. If your symptoms are worse after you eat a certain food, you may want to stop eating that food to see if your symptoms get better. · Do not smoke or chew tobacco. Smoking can make GERD worse.  If you need help quitting, talk to your doctor about stop-smoking programs and medicines. These can increase your chances of quitting for good. · If you have GERD symptoms at night, raise the head of your bed 6 to 8 inches by putting the frame on blocks or placing a foam wedge under the head of your mattress. (Adding extra pillows does not work.) · Do not wear tight clothing around your middle. · Lose weight if you need to. Losing just 5 to 10 pounds can help. When should you call for help? Call your doctor now or seek immediate medical care if: 
· You have new or different belly pain. · Your stools are black and tarlike or have streaks of blood. Watch closely for changes in your health, and be sure to contact your doctor if: 
· Your symptoms have not improved after 2 days. · Food seems to catch in your throat or chest. 
Where can you learn more? Go to http://judith-alex.info/. Enter J077 in the search box to learn more about \"Gastroesophageal Reflux Disease (GERD): Care Instructions. \" Current as of: August 9, 2016 Content Version: 11.3 © 2706-4993 [employer]. Care instructions adapted under license by [employer] (which disclaims liability or warranty for this information). If you have questions about a medical condition or this instruction, always ask your healthcare professional. [address] any warranty or liability for your use of this information. Dexlansoprazole (By mouth) Dexlansoprazole (dex-lewis-REY-pra-zole) Treats heartburn, gastroesophageal reflux disease (GERD), or a damaged esophagus. This medicine is a proton pump inhibitor (PPI). Brand Name(s): Luis M Bryant There may be other brand names for this medicine. When This Medicine Should Not Be Used: This medicine is not right for everyone. Do not use it if you had an allergic reaction to dexlansoprazole. How to Use This Medicine:  
Delayed Release Capsule, Tablet Disintegrating, Delayed Release · Take this medicine as directed. You may need to take it for several weeks before you feel better. · Delayed-release capsule: ¨ Swallow it whole. If you cannot swallow the capsule whole, you may open it and pour the medicine into a tablespoon of applesauce. Swallow the mixture right away without chewing. Do not store the mixed medicine for later use. ¨ If capsule is given through an oral syringe:  
§ Open the capsule and mix the contents with 20 milliliters (mL) of water. § Use an oral syringe to draw up all of the mixture and swirl gently. § Give the mixture right away. Do not chew. § Refill the syringe with 10 mL of water. Swirl gently. Swallow the water. § Refill the syringe with another 10 mL of water and swallow it right away. This will make sure you get your whole dose. ¨ If capsule is given through a feeding tube:  
§ Open the capsule and combine the medicine with 20 milliliters (mL) of water. § Use a catheter-tip syringe to draw up the mixture and swirl gently. § Inject the medicine into the feeding tube right away. § Refill the syringe with 10 mL of water. Swirl it gently. Inject the water into the tube. § Refill with another 10 mL of water and inject this into the tube. This will make sure the full dose is given. · Delayed-release disintegrating tablet:  
¨ Take this medicine at least 30 minutes before a meal. 
¨ Do not break or cut the tablet. ¨ Place the tablet on the tongue, allow to dissolve, and swallow it without water. Do not chew the microgranules. Or, you may swallow the tablet whole with water. ¨ If tablet is given through an oral syringe: § Place one tablet in an oral syringe and draw up 20 milliliters (mL) of water. § Swirl the syringe gently to keep the granules from settling. § Give the mixture directly into the mouth right away. Do not store the mixed medicine for later use.  
§ To rinse any leftover medicine in the syringe, refill the syringe with 10 mL of water, swirl gently and swallow the water. § Repeat with an additional 10 mL of water. ¨ If tablet is given through a feeding tube: § Place one tablet in a catheter-tip syringe and draw up 20 milliliters (mL) of water. § Shake gently to keep the granules from settling, and inject the medicine into the NG tube right away. § Refill the syringe with the 10 mL of water. § Shake it gently, and inject it into the tube to rinse any leftover medicine through the tube. § Repeat with an additional 10 mL of water. · This medicine should come with a Medication Guide. Ask your pharmacist for a copy if you do not have one. · Missed dose: Take a dose as soon as you remember. If it is almost time for your next dose, wait until then and take a regular dose. Do not take extra medicine to make up for a missed dose. · Store the medicine in a closed container at room temperature, away from heat, moisture, and direct light. Drugs and Foods to Avoid: Ask your doctor or pharmacist before using any other medicine, including over-the-counter medicines, vitamins, and herbal products. · Do not use this medicine if you are also using products containing rilpivirine. · Dexlansoprazole affects digestion and may keep other medicines from working correctly. Tell your doctor if you are using any of the following: ¨ Ampicillin, atazanavir, dasatinib, digoxin, erlotinib, itraconazole, ketoconazole, methotrexate, mycophenolate mofetil, nelfinavir, nilotinib, rifampin, ritonavir, saquinavir, Solomon's wort, tacrolimus, voriconazole ¨ Blood thinner (including warfarin) ¨ Diuretic (water pill) ¨ Iron supplements · Do not drink alcohol while you are using this medicine. Warnings While Using This Medicine: · Tell your doctor if you are pregnant or breastfeeding, or if you have kidney disease, liver disease, lupus, or osteoporosis. · This medicine may cause the following problems: ¨ Kidney problems ¨ Low vitamin B12 or magnesium levels in the blood ¨ Increased risk of broken bones in the hip, wrist, or spine · This medicine can cause diarrhea. Call your doctor if the diarrhea becomes severe, does not stop, or is bloody. Do not take any medicine to stop diarrhea until you have talked to your doctor. Diarrhea can occur 2 months or more after you stop taking this medicine. · Tell any doctor or dentist who treats you that you are using this medicine. This medicine may affect certain medical test results. · Keep all medicine out of the reach of children. Never share your medicine with anyone. Possible Side Effects While Using This Medicine:  
Call your doctor right away if you notice any of these side effects: · Allergic reaction: Itching or hives, swelling in your face or hands, swelling or tingling in your mouth or throat, chest tightness, trouble breathing · Blistering, peeling, or red skin rash · Fever, joint pain, swelling in your body, unusual weight gain, changes in how much or how often you urinate · Joint pain, rash on your cheeks or arms that gets worse in the sun · Seizures, dizziness, fast or uneven heartbeat, muscle cramps or twitching · Severe diarrhea that does not go away, stomach cramps, fever If you notice other side effects that you think are caused by this medicine, tell your doctor. Call your doctor for medical advice about side effects. You may report side effects to FDA at 6-469-FDA-6802 © 2017 [facility] Information is for End User's use only and may not be sold, redistributed or otherwise used for commercial purposes. The above information is an  only. It is not intended as medical advice for individual conditions or treatments. Talk to your doctor, nurse or pharmacist before following any medical regimen to see if it is safe and effective for you. Introducing [facility]! [facility] introduces [employer] patient portal. Now you can access parts of your medical record, email your doctor's office, and request medication refills online. 1. In your internet browser, go to [email or web address] 2. Click on the First Time User? Click Here link in the Sign In box. You will see the New Member Sign Up page. 3. Enter your [employer] Access Code exactly as it appears below. You will not need to use this code after youve completed the sign-up process. If you do not sign up before the expiration date, you must request a new code. · [employer] Access Code: [email or web address] Expires: 10/31/2017  8:27 AM 
 
4. Enter the last four digits of your Social Security Number (xxxx) and Date of Birth (mm/dd/yyyy) as indicated and click Submit. You will be taken to the next sign-up page. 5. Create a [employer] ID. This will be your [employer] login ID and cannot be changed, so think of one that is secure and easy to remember. 6. Create a [employer] password. You can change your password at any time. 7. Enter your Password Reset Question and Answer. This can be used at a later time if you forget your password. 8. Enter your e-mail address. You will receive e-mail notification when new information is available in [address] 9. Click Sign Up. You can now view and download portions of your medical record. 10. Click the Download Summary menu link to download a portable copy of your medical information. If you have questions, please visit the Frequently Asked Questions section of the [employer] website. Remember, [employer] is NOT to be used for urgent needs. For medical emergencies, dial 911. Now available from your iPhone and Android! Please provide this summary of care documentation to your next provider. Your primary care clinician is listed as Catana Cloud. If you have any questions after today's visit, please call [phone].

## 2017-08-07 PROBLEM — E11.9 TYPE 2 DIABETES MELLITUS TREATED WITHOUT INSULIN (HCC): Status: ACTIVE | Noted: 2017-08-07

## 2017-08-07 PROBLEM — Z78.9 ALCOHOL USE: Status: ACTIVE | Noted: 2017-08-07

## 2017-08-07 PROBLEM — I10 HYPERTENSION WITH GOAL BLOOD PRESSURE LESS THAN 130/80: Status: ACTIVE | Noted: 2017-08-07

## 2017-08-07 PROBLEM — E78.1 HYPERTRIGLYCERIDEMIA: Status: ACTIVE | Noted: 2017-08-07

## 2017-08-07 PROBLEM — G43.111 INTRACTABLE MIGRAINE WITH AURA WITH STATUS MIGRAINOSUS: Status: ACTIVE | Noted: 2017-08-07

## 2017-08-07 PROBLEM — R74.8 ELEVATED SERUM ALKALINE PHOSPHATASE LEVEL: Status: ACTIVE | Noted: 2017-08-07

## 2017-08-07 PROBLEM — K21.9 GASTROESOPHAGEAL REFLUX DISEASE WITHOUT ESOPHAGITIS: Status: ACTIVE | Noted: 2017-08-07

## 2017-08-07 RX ORDER — OMEGA-3-ACID ETHYL ESTERS 1 G/1
2 CAPSULE, LIQUID FILLED ORAL 2 TIMES DAILY
Qty: 360 CAP | Refills: 3 | Status: SHIPPED | COMMUNITY
Start: 2017-08-07

## 2017-08-07 RX ORDER — OMEGA-3-ACID ETHYL ESTERS 1 G/1
2 CAPSULE, LIQUID FILLED ORAL 2 TIMES DAILY
Qty: 360 CAP | Refills: 0 | Status: SHIPPED | COMMUNITY
Start: 2017-08-07 | End: 2017-08-10 | Stop reason: SDUPTHER

## 2017-08-07 NOTE — PROGRESS NOTES
Peyton Shen,  Please notify this new patient of the following lab results: Appt 8/9/17  1) Hep C is equivocal. Pt needs to have Hep with pcr drawn. Please draw this lab when you see him for his appt  2) A1c 8.4. Will order Kombiglyze via [facility] as sample and as a program medication. Pt needs to come into the office to sign [facility] paperwork and to  samples if available. Pt needs to schedule with you for the 5 week DM program as well. 3) . Will order Lovaza via [facility] as sample and as a program medication. Pt needs to come into the office to sign [facility] paperwork and to  samples if available; . Let me know what pts BP is when you check it. May add caduet to him for BP and cholesterol. If caduet iss appropriate then pt will dc zocor   4) Alk phos slightly elevated.  Encourage pt to cut back on alcohol    Please inform patient they will need labs at 3 follow up appt

## 2017-08-10 ENCOUNTER — LAB ONLY (OUTPATIENT)
Dept: FAMILY MEDICINE CLINIC | Age: 37
End: 2017-08-10

## 2017-08-10 ENCOUNTER — HOSPITAL ENCOUNTER (OUTPATIENT)
Dept: LAB | Age: 37
Discharge: HOME | End: 2017-08-10

## 2017-08-10 ENCOUNTER — OFFICE VISIT (OUTPATIENT)
Dept: FAMILY MEDICINE CLINIC | Age: 37
End: 2017-08-10

## 2017-08-10 DIAGNOSIS — Z55.9 EDUCATIONAL CIRCUMSTANCE: Primary | ICD-10-CM

## 2017-08-10 DIAGNOSIS — Z13.9 SCREENING PROCEDURE: Primary | ICD-10-CM

## 2017-08-10 DIAGNOSIS — K21.9 GASTROESOPHAGEAL REFLUX DISEASE WITHOUT ESOPHAGITIS: Primary | ICD-10-CM

## 2017-08-10 DIAGNOSIS — Z13.9 SCREENING PROCEDURE: ICD-10-CM

## 2017-08-10 LAB
HAV IGM SERPL QL IA: NEGATIVE
HBV CORE IGM SER QL: NEGATIVE
HBV SURFACE AG SER QL: <0.1 INDEX
HBV SURFACE AG SER QL: NEGATIVE
HCV AB SER IA-ACNC: 0.27 INDEX
HCV AB SERPL QL IA: NEGATIVE
HCV COMMENT,HCGAC: NORMAL
SP1: NORMAL
SP2: NORMAL
SP3: NORMAL

## 2017-08-10 PROCEDURE — 87522 HEPATITIS C REVRS TRNSCRPJ: CPT | Performed by: NURSE PRACTITIONER

## 2017-08-10 PROCEDURE — 80074 ACUTE HEPATITIS PANEL: CPT | Performed by: NURSE PRACTITIONER

## 2017-08-10 RX ORDER — ESOMEPRAZOLE MAGNESIUM 40 MG/1
40 CAPSULE, DELAYED RELEASE ORAL
Qty: 90 CAP | Refills: 0 | Status: SHIPPED | COMMUNITY
Start: 2017-08-10 | End: 2018-05-17 | Stop reason: ALTCHOICE

## 2017-08-10 SDOH — EDUCATIONAL SECURITY - EDUCATION ATTAINMENT: PROBLEMS RELATED TO EDUCATION AND LITERACY, UNSPECIFIED: Z55.9

## 2017-08-10 NOTE — PROGRESS NOTES
Cheryl New is a 39 y.o. male is here for his initial visit with the Nurse Navigator for welcome and informative class on how the [facility] works and the services we can provide. He was also to have BP taken but has not taken his lisinopril today. It is the preference of his provider not to take BP  If med is not taken 2 hours PTA. He is aware to take BP meds 2 hours before every OV. He will make appt to start the DM pathway and have BP checked. He will also be having Hep labs redrawn. We have reviewed [facility]:   Hours of operation and number to contact us. Inclement weather policy     No Show Policy     Lab work (Hours to have lab work drawn and appointment with NN to obtain results). Medication Policies including times to  meds, number to dial to reach your pharmacy technician and the paperwork that must be signed to obtain meds. Foot care thru the [facility] foot ministry hours as well as directions     Dental Clinic      Diabetic eye exams     Sick visits (will make appt with provider to address RUQ pain he verbalized at appt. .has been having off and on for months)     APA Program including location at [facility] and phone contact number-enc to call today. Mental Health appointments with Ms. Yadiel Freddie [place] Number and how to contact AA/NA meetings for help with addictions      We have reviewed Jenn Mcclure's lab work today as requested by provider. Will stop and sign [facility] paperwork and  samples.

## 2017-08-10 NOTE — PROGRESS NOTES
dexilant was not available for sample via [facility] at time of pts office visit. Pt here today to have Hep labs drawn. [facility], offered Nexium 40mg samples for pt. Order placed for pt to obtain Nexium 40mg every day prn for reflux. 1. Gastroesophageal reflux disease without esophagitis    - esomeprazole (NEXIUM) 40 mg capsule; Take 1 Cap by mouth daily as needed. For reflux  Dispense: 90 Cap;  Refill: 0

## 2017-08-10 NOTE — PROGRESS NOTES
Venipuncture for labs performed using 23G butterfly Right AC  using aseptic technique. Skin intact and dry. No active bleeding or complications noted. Bandaid dressing applied.

## 2017-08-11 LAB
HCV GENOTYPE: NORMAL
HCV RNA SERPL NAA+PROBE-ACNC: NORMAL IU/ML
HCV RNA SERPL NAA+PROBE-LOG IU: NORMAL LOG10 IU/ML
TEST INFORMATION, 550045: NORMAL

## 2017-08-14 ENCOUNTER — OFFICE VISIT (OUTPATIENT)
Dept: FAMILY MEDICINE CLINIC | Age: 37
End: 2017-08-14

## 2017-08-14 ENCOUNTER — HOSPITAL ENCOUNTER (OUTPATIENT)
Dept: LAB | Age: 37
Discharge: HOME | End: 2017-08-14

## 2017-08-14 ENCOUNTER — HOSPITAL ENCOUNTER (OUTPATIENT)
Dept: GENERAL RADIOLOGY | Age: 37
Discharge: HOME | End: 2017-08-14
Payer: SUBSIDIZED

## 2017-08-14 VITALS
OXYGEN SATURATION: 97 % | TEMPERATURE: 98.1 F | BODY MASS INDEX: 44.1 KG/M2 | WEIGHT: 315 LBS | RESPIRATION RATE: 20 BRPM | HEART RATE: 108 BPM | HEIGHT: 71 IN | SYSTOLIC BLOOD PRESSURE: 128 MMHG | DIASTOLIC BLOOD PRESSURE: 85 MMHG

## 2017-08-14 DIAGNOSIS — R10.11 RIGHT UPPER QUADRANT ABDOMINAL PAIN: ICD-10-CM

## 2017-08-14 DIAGNOSIS — K21.9 GASTROESOPHAGEAL REFLUX DISEASE WITHOUT ESOPHAGITIS: ICD-10-CM

## 2017-08-14 DIAGNOSIS — K59.00 CONSTIPATION, UNSPECIFIED CONSTIPATION TYPE: ICD-10-CM

## 2017-08-14 DIAGNOSIS — E78.5 DYSLIPIDEMIA, GOAL LDL BELOW 100: ICD-10-CM

## 2017-08-14 DIAGNOSIS — R10.11 RIGHT UPPER QUADRANT ABDOMINAL PAIN: Primary | ICD-10-CM

## 2017-08-14 PROCEDURE — 86677 HELICOBACTER PYLORI ANTIBODY: CPT | Performed by: NURSE PRACTITIONER

## 2017-08-14 PROCEDURE — 74000 XR ABD (KUB): CPT

## 2017-08-14 RX ORDER — EZETIMIBE AND SIMVASTATIN 10; 10 MG/1; MG/1
1 TABLET ORAL
Qty: 90 TAB | Refills: 3 | Status: SHIPPED | COMMUNITY
Start: 2017-08-14 | End: 2017-12-04 | Stop reason: SDUPTHER

## 2017-08-14 NOTE — PATIENT INSTRUCTIONS

## 2017-08-14 NOTE — PROGRESS NOTES
Abdominal Pain  Patient complains of abdominal pain. The pain is described as dull, sharp and stabbing, and is 6-7/10 in intensity. Started about 3 months ago. Pain is located in the RUQ without radiation. Onset was 3 months ago. Symptoms have been gradually worsening since. Aggravating factors: none. Alleviating factors: usually will go away on its on but I can still feel that there was pain there. Associated symptoms: constipation. I used to go everyday but now I find I am only going about every 2 days. Taking Nexium which does help. I think the spicy foods may be contributing to my stomach pains. I have noticed that my stomach will hurt when I eat spicy stuff. I only have a few pills left of zocor. I have taken it for over a year now. I like hot spice foods, jalapenos, hot sauce, etc. I have only had 2 beers this week. 1. Right upper quadrant abdominal pain  Bilirubin normal. Liver not palpable-not enlarged. - H PYLORI AB, IGG, QT; Future  - XR ABD (KUB); Future    2. Gastroesophageal reflux disease without esophagitis  Continue Nexium. Avoid alcohol and spicy foods. - H PYLORI AB, IGG, QT; Future    3. Constipation, unspecified constipation type      Discussed in great detail with patient ie Hep C + with \"Not detected\" result. Instructed pt how one can contract Hep C as well. Visit Vitals    /85    Pulse (!) 108    Temp 98.1 °F (36.7 °C)    Resp 20    Ht 5' 11\" (1.803 m)    SpO2 97%     Current Outpatient Prescriptions on File Prior to Visit   Medication Sig Dispense Refill    esomeprazole (NEXIUM) 40 mg capsule Take 1 Cap by mouth daily as needed. For reflux 90 Cap 0    sAXagliptin-metFORMIN (KOMBIGLYZE XR) 2.5-1,000 mg TM24 Take 1 Tab by mouth daily. For diabetes 90 Tab 3    omega-3 acid ethyl esters (LOVAZA) 1 gram capsule Take 2 Caps by mouth two (2) times a day.  For triglycerides 360 Cap 3    lisinopril-hydroCHLOROthiazide (PRINZIDE, ZESTORETIC) 20-12.5 mg per tablet Take 1 Tab by mouth daily. 30 Tab 1    amitriptyline (ELAVIL) 25 mg tablet Take 1 Tab by mouth nightly. For migraine prevention 30 Tab 3     No current facility-administered medications on file prior to visit.

## 2017-08-15 ENCOUNTER — TELEPHONE (OUTPATIENT)
Dept: FAMILY MEDICINE CLINIC | Age: 37
End: 2017-08-15

## 2017-08-15 DIAGNOSIS — A04.8 H. PYLORI INFECTION: Primary | ICD-10-CM

## 2017-08-15 LAB — H PYLORI IGG SER IA-ACNC: >8 U/ML (ref 0–0.8)

## 2017-08-15 RX ORDER — PHENOL/SODIUM PHENOLATE
20 AEROSOL, SPRAY (ML) MUCOUS MEMBRANE 2 TIMES DAILY
Qty: 20 TAB | Refills: 0
Start: 2017-08-15 | End: 2017-12-04 | Stop reason: ALTCHOICE

## 2017-08-15 NOTE — PROGRESS NOTES
Nerissa Zimmerman, please call pt with the following: Pt is positive for H Pylori. Will order Pylera via [facility]. Pt needs to come into the office to sign [facility] paperwork. Pt will need to buy OTC omeprazole (Prilosec) 20mg and take 1 tab twice a day while taking the pylera.

## 2017-08-15 NOTE — TELEPHONE ENCOUNTER
Per provider, called patient and explained H Pylori medication. Informed patient that he is to come in tomorrow morning and  Pylera and read directions thoroughly with understanding to take 3 caps four times daily x 10 days. He is also to take OTC omeprazole 20 mg by mouth two times daily for 10 days. Patient verbally repeated understanding of oral meds and treatment.

## 2017-08-29 ENCOUNTER — OFFICE VISIT (OUTPATIENT)
Dept: FAMILY MEDICINE CLINIC | Age: 37
End: 2017-08-29

## 2017-08-29 VITALS — HEART RATE: 92 BPM

## 2017-08-29 DIAGNOSIS — Z71.89 DIABETES EDUCATION, ENCOUNTER FOR: Primary | ICD-10-CM

## 2017-08-29 NOTE — PROGRESS NOTES
Blaze Lyman is a 39 y.o. male here for week one of the diabetic teaching module from [facility] clinic diabetic pathway. The goals of using the [facility] Diabetic Pathway are reviewed today. The issues noted below were reviewed at length with the patient. Diabetes is usually defined as two fasting blood sugar values over 126, although some patients are still diagnosed using the older glucose tolerance test. Normal blood sugar values in non-diabetic patients run approximately . The higher the sugars at the time of diagnosis, the greater the likelihood that the patient will have significant symptoms such as thirst, blurred vision, urinary frequency, and fatigue, but sometimes people with very high blood sugar values may feel surprisingly well, especially if the elevated blood sugars have been present for some time. The signs and symptoms of hyperglycemia were discussed with patient in detail. Diabetic disease process was reviewed with patient: to have diabetes means that the patient's body is unable to properly regulate the level of sugar in the blood, and the mainstay of treatment for most patients involves regular physical exercise, weight loss, and dietary changes, especially lowering the consumption of foods containing high amounts of simple sugars. Without following these basic concepts it will be almost impossible to control the blood sugar adequately. When these lifestyle changes are inadequate, or if the initial blood sugar values are very high, the next step is to add one or more oral medications. Sometimes, in more severe or difficult to control cases of diabetes, insulin injections are necessary. Frequently patients are taught to monitor their own blood sugars at home. He was encouraged to record blood sugars BID. He was provided with log for recording.  It is crucial to remember that diabetes is a serious disease that can cause life threatening complications if it is not properly cared for; conversely, the lower that one can maintain one's blood sugar, the less the likelihood of developing serious problems. The hemoglobin A1C is a blood test which indicates, on average, what the patient's blood sugar has been running over the prior 8-10 weeks. Ideally, this value should be maintained at less than 7.0%. If over 9.0% the incidence of complications rises dramatically. This test will be done approximately two to four times a year, depending on the individual patient. The patient was also given week one glucose monitoring guidelines. Pt has been performing home blood glucose testing and correctly states procedure for the same. Time provided questions and they are encouraged to call this nurse at any time with questions @ [phone].

## 2017-08-31 ENCOUNTER — TELEPHONE (OUTPATIENT)
Dept: FAMILY MEDICINE CLINIC | Age: 37
End: 2017-08-31

## 2017-08-31 NOTE — TELEPHONE ENCOUNTER
Medication: Lovaza 1gm, dose: 2 caps, how often: bid, current number of medication days provided: 90, refill per application. Lot #: [id number], EXP 08/2018. This medication was received and verified for the following 1. Correct Patient, 2. Correct Diagnosis, 3. Correct Drug, 4. Correct route, and no current allergy to medication. Medication: Kombiglyze 2.5/1000, dose: 1 tab, how often: qd , current number of medication days provided: 90, refill per application. Lot #: [id number], EXP 08/2018. This medication was received and verified for the following 1. Correct Patient, 2. Correct Diagnosis, 3. Correct Drug, 4. Correct route, and no current allergy to medication. Please contact patient to come  their medications.      Maria Canavan, MSN,RN,[facility], [facility] correct kombiglyze label to read \"for diabetes\"

## 2017-09-13 ENCOUNTER — OFFICE VISIT (OUTPATIENT)
Dept: FAMILY MEDICINE CLINIC | Age: 37
End: 2017-09-13

## 2017-09-13 DIAGNOSIS — Z71.89 DIABETES EDUCATION, ENCOUNTER FOR: Primary | ICD-10-CM

## 2017-09-13 NOTE — PROGRESS NOTES
Deepthi Green is a 39 y.o. male is here for NN visit to review Week one and discuss week 2 of the [facility] Diabetic Pathway. He did not the meal log in for review. He does not give any excuse but\"I am not gonna lie, I was lazy\". He relates he is making better food choices today. He is going to try to complete the log as requested. We have reviewed the S/S of hyper/hypoglycemia, the food pyramid, Food choices for meal planning , Food choices to AVOID when planning meals and the measuring and estimating of portions. He was started on a 2000 calorie ADA diet by verbal order Josh Grewal NP. Time was provided for discussion and any questions. He is encouraged to call this nurse for any questions/concerns and/or write down any questions so we can review on the next visit.

## 2017-10-16 DIAGNOSIS — I10 HYPERTENSION WITH GOAL BLOOD PRESSURE LESS THAN 130/80: ICD-10-CM

## 2017-10-16 RX ORDER — LISINOPRIL AND HYDROCHLOROTHIAZIDE 12.5; 2 MG/1; MG/1
1 TABLET ORAL DAILY
Qty: 30 TAB | Refills: 1 | Status: SHIPPED | OUTPATIENT
Start: 2017-10-16 | End: 2017-12-04 | Stop reason: SDUPTHER

## 2017-10-16 NOTE — TELEPHONE ENCOUNTER
Requested Prescriptions     Signed Prescriptions Disp Refills    lisinopril-hydroCHLOROthiazide (PRINZIDE, ZESTORETIC) 20-12.5 mg per tablet 30 Tab 1     Sig: Take 1 Tab by mouth daily.      Authorizing Provider: Jose Koehler     Pt has f/u appt 11/8/17

## 2017-10-17 ENCOUNTER — OFFICE VISIT (OUTPATIENT)
Dept: FAMILY MEDICINE CLINIC | Age: 37
End: 2017-10-17

## 2017-10-17 VITALS
HEART RATE: 96 BPM | BODY MASS INDEX: 42.96 KG/M2 | RESPIRATION RATE: 16 BRPM | SYSTOLIC BLOOD PRESSURE: 138 MMHG | WEIGHT: 308 LBS | OXYGEN SATURATION: 98 % | DIASTOLIC BLOOD PRESSURE: 89 MMHG | TEMPERATURE: 98.7 F

## 2017-10-17 DIAGNOSIS — B35.1 ONYCHOMYCOSIS OF TOENAIL: Primary | ICD-10-CM

## 2017-10-17 DIAGNOSIS — B49 FUNGUS INFECTION: Primary | ICD-10-CM

## 2017-10-17 RX ORDER — TERBINAFINE HYDROCHLORIDE 250 MG/1
250 TABLET ORAL DAILY
Qty: 30 TAB | Refills: 2 | Status: SHIPPED | OUTPATIENT
Start: 2017-10-17

## 2017-10-17 NOTE — PROGRESS NOTES
Pt in to see NN for DM education. Pt complained of fungal infection to his great toes. Noted in between toes and under the toes to be moist and slightly macerated. Instructed pt to dry between his toes well and to apply gauze or other material to help wick dampness away from skin to include specialty socks as well. Will escribe lamisil as OTC was not effective. Requested Prescriptions     Signed Prescriptions Disp Refills    terbinafine HCl (LAMISIL) 250 mg tablet 30 Tab 2     Sig: Take 1 Tab by mouth daily. For foot fungus     1. Onychomycosis of toenail    - terbinafine HCl (LAMISIL) 250 mg tablet; Take 1 Tab by mouth daily. For foot fungus  Dispense: 30 Tab;  Refill: 2

## 2017-10-17 NOTE — PROGRESS NOTES
Patient here for eval of foot fungus of both feet between all toes. Bilateral feet are noted to have a scaly rash with moist areas in between toes. There are small open cracks at the base of the right and lefts fourth and fifth toes. 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 He has been treating topically with OTC fungal cream and using baby powder in an attempt to keep feet dry but the rash has not improved. Ms Veronica Youngblood examined patients feet and will treat with Lamisil po. Skin care discussed with patient. He agrees to keep feet and toes dry by using cotton balls in between toes and change once a shift. He stated his fasting BG this am was 118. He is encouraged to be especially careful with the open areas on his feet. He will come back in 2 weeks for examination of open areas. He agrees to inspect these areas daily and make sure they do not grow larger in size or begin to drain an purulent material. He agrees to inspect the color of toes and make sure circulation checks show brisk refill to nailbeds.

## 2017-10-31 ENCOUNTER — OFFICE VISIT (OUTPATIENT)
Dept: FAMILY MEDICINE CLINIC | Age: 37
End: 2017-10-31

## 2017-10-31 DIAGNOSIS — Z23 ENCOUNTER FOR IMMUNIZATION: Primary | ICD-10-CM

## 2017-10-31 NOTE — PROGRESS NOTES
Patient here after greater than one month pause into DM pathway classes. Mr. David Sanford states he started a new where his hours are 2 am- 6 am. This has left him struggling with his diet and testing his BG levels. He states he is also having a lot of stress at home with his children (teenagers). He admits he has not checked his BG levels since last visit. We have discussed the importance of taking care of himself:   1) By going to bed early enough in the evening that get gets 5-6 hours before going to work. We have discussed that driving with sleep depravation is as dangerous if not                 more than driving drunk. He agrees to pull over and take nap until he is safe to drive home. 2) He is eating only one meal per day. He is aware \"I am not eating right either\". He admits to eating or chain smoking to stay awake when driving to or home from work. 3) We have discussed the role stress plays in DM. He agrees to make an appointment with Ms. Mary Lee today. 4) We have discussed the need to take care of himself first so that he may better care for his family. 5) He agrees to at least return with meter or BG readings on next visit. 6) He was given the number for Dr. Nelson Nearing again today as he has not yet made his appointment. He will also be put on the dental list as he has a left upper molar causing pain. All questions answered and concerns addressed.

## 2017-11-08 ENCOUNTER — OFFICE VISIT (OUTPATIENT)
Dept: FAMILY MEDICINE CLINIC | Age: 37
End: 2017-11-08

## 2017-11-08 DIAGNOSIS — Z71.89 DIABETES EDUCATION, ENCOUNTER FOR: Primary | ICD-10-CM

## 2017-11-14 DIAGNOSIS — E11.9 TYPE 2 DIABETES MELLITUS TREATED WITHOUT INSULIN (HCC): Primary | ICD-10-CM

## 2017-11-14 PROBLEM — I10 HYPERTENSION WITH GOAL BLOOD PRESSURE LESS THAN 130/80: Chronic | Status: ACTIVE | Noted: 2017-08-07

## 2017-11-14 PROBLEM — K21.9 GASTROESOPHAGEAL REFLUX DISEASE WITHOUT ESOPHAGITIS: Chronic | Status: ACTIVE | Noted: 2017-08-07

## 2017-11-14 PROBLEM — E78.1 HYPERTRIGLYCERIDEMIA: Chronic | Status: ACTIVE | Noted: 2017-08-07

## 2017-11-15 ENCOUNTER — TELEPHONE (OUTPATIENT)
Dept: FAMILY MEDICINE CLINIC | Age: 37
End: 2017-11-15

## 2017-11-22 ENCOUNTER — HOSPITAL ENCOUNTER (OUTPATIENT)
Dept: LAB | Age: 37
Discharge: HOME | End: 2017-11-22

## 2017-11-22 ENCOUNTER — LAB ONLY (OUTPATIENT)
Dept: FAMILY MEDICINE CLINIC | Age: 37
End: 2017-11-22

## 2017-11-22 DIAGNOSIS — E11.9 TYPE 2 DIABETES MELLITUS TREATED WITHOUT INSULIN (HCC): Primary | Chronic | ICD-10-CM

## 2017-11-22 DIAGNOSIS — E11.9 TYPE 2 DIABETES MELLITUS TREATED WITHOUT INSULIN (HCC): ICD-10-CM

## 2017-11-22 LAB
EST. AVERAGE GLUCOSE BLD GHB EST-MCNC: 148 MG/DL
HBA1C MFR BLD: 6.8 % (ref 4.2–5.6)

## 2017-11-22 PROCEDURE — 83036 HEMOGLOBIN GLYCOSYLATED A1C: CPT | Performed by: NURSE PRACTITIONER

## 2017-12-04 ENCOUNTER — OFFICE VISIT (OUTPATIENT)
Dept: FAMILY MEDICINE CLINIC | Age: 37
End: 2017-12-04

## 2017-12-04 VITALS
HEART RATE: 110 BPM | TEMPERATURE: 98.3 F | OXYGEN SATURATION: 99 % | RESPIRATION RATE: 20 BRPM | WEIGHT: 283.2 LBS | SYSTOLIC BLOOD PRESSURE: 130 MMHG | HEIGHT: 71 IN | DIASTOLIC BLOOD PRESSURE: 86 MMHG | BODY MASS INDEX: 39.65 KG/M2

## 2017-12-04 DIAGNOSIS — K21.9 GASTROESOPHAGEAL REFLUX DISEASE WITHOUT ESOPHAGITIS: Chronic | ICD-10-CM

## 2017-12-04 DIAGNOSIS — G43.111 INTRACTABLE MIGRAINE WITH AURA WITH STATUS MIGRAINOSUS: ICD-10-CM

## 2017-12-04 DIAGNOSIS — I10 HYPERTENSION WITH GOAL BLOOD PRESSURE LESS THAN 130/80: Primary | ICD-10-CM

## 2017-12-04 DIAGNOSIS — E78.5 DYSLIPIDEMIA, GOAL LDL BELOW 70: ICD-10-CM

## 2017-12-04 DIAGNOSIS — E11.65 TYPE 2 DIABETES MELLITUS WITH HYPERGLYCEMIA, WITHOUT LONG-TERM CURRENT USE OF INSULIN (HCC): ICD-10-CM

## 2017-12-04 DIAGNOSIS — E78.5 DYSLIPIDEMIA, GOAL LDL BELOW 100: ICD-10-CM

## 2017-12-04 RX ORDER — DEXLANSOPRAZOLE 30 MG/1
30 CAPSULE, DELAYED RELEASE ORAL
Qty: 90 CAP | Refills: 3 | Status: SHIPPED | COMMUNITY
Start: 2017-12-04

## 2017-12-04 RX ORDER — EZETIMIBE AND SIMVASTATIN 10; 10 MG/1; MG/1
1 TABLET ORAL
Qty: 90 TAB | Refills: 3 | Status: SHIPPED | COMMUNITY
Start: 2017-12-04

## 2017-12-04 RX ORDER — AMITRIPTYLINE HYDROCHLORIDE 25 MG/1
12.5 TABLET, FILM COATED ORAL
Qty: 30 TAB | Refills: 6 | Status: SHIPPED | OUTPATIENT
Start: 2017-12-04

## 2017-12-04 RX ORDER — LISINOPRIL AND HYDROCHLOROTHIAZIDE 12.5; 2 MG/1; MG/1
1 TABLET ORAL DAILY
Qty: 30 TAB | Refills: 3 | Status: SHIPPED | OUTPATIENT
Start: 2017-12-04

## 2017-12-04 NOTE — PROGRESS NOTES
[address]  [facility], [address] [place] [address]  [phone] office/[phone] fax      12/4/2017    Reason for visit:   Chief Complaint   Patient presents with    Follow Up Chronic Condition    Medication Evaluation     Pt says his Elavil is too strong. Pt says he drag the next day       Patient: Richy Shaw, 1980, [id number]       Primary MD: Genet Thorne NP    Subjective:   Richy Shaw, a 39 y.o. male, who presents for Follow Up Chronic Condition and Medication Evaluation (Pt says his Elavil is too strong. Pt says he drag the next day)      Past Medical History:   Diagnosis Date    Alcohol use 8/7/2017    [facility] 40.0-44.9[facility] 8/7/2017    Elevated serum alkaline phosphatase level 8/7/2017    Gastroesophageal reflux disease without esophagitis 8/7/2017    GERD (gastroesophageal reflux disease)     H/O seasonal allergies     Hepatitis C antibody test positive 08/2017    HCV Not detected    Hypertension     Hypertension with goal blood pressure less than 130/80 8/7/2017    Intractable migraine with aura with status migrainosus 8/7/2017    Migraine     Type 2 diabetes mellitus treated without insulin ([facility] 8/7/2017       No past surgical history on file.     Social History     Social History    Marital status: SINGLE     Spouse name: N/A    Number of children: N/A    Years of education: 15     Occupational History    unemployed      Social History Main Topics    Smoking status: Former Smoker    Smokeless tobacco: Never Used      Comment: e- cigarette user    Alcohol use Yes      Comment: occasional    Drug use: No    Sexual activity: Yes     Partners: Female     Birth control/ protection: None     Other Topics Concern     Service No    Blood Transfusions No    Occupational Exposure No    Hobby Hazards No    Sleep Concern No    Weight Concern Yes    Exercise Yes     walking    Bike Helmet No    Seat Belt Yes    Self-Exams No     Social History Narrative       No Known Allergies    Current Outpatient Prescriptions on File Prior to Visit   Medication Sig Dispense Refill    terbinafine HCl (LAMISIL) 250 mg tablet Take 1 Tab by mouth daily. For foot fungus 30 Tab 2    ezetimibe-simvastatin (VYTORIN) 10-10 mg per tablet Take 1 Tab by mouth nightly. 90 Tab 3    esomeprazole (NEXIUM) 40 mg capsule Take 1 Cap by mouth daily as needed. For reflux 90 Cap 0    sAXagliptin-metFORMIN (KOMBIGLYZE XR) 2.5-1,000 mg TM24 Take 1 Tab by mouth daily. For diabetes 90 Tab 3    omega-3 acid ethyl esters (LOVAZA) 1 gram capsule Take 2 Caps by mouth two (2) times a day. For triglycerides 360 Cap 3    Omeprazole delayed release (PRILOSEC D/R) 20 mg tablet Take 1 Tab by mouth two (2) times a day. Take for 10 days with the pylera for stomach infection 20 Tab 0     No current facility-administered medications on file prior to visit. Review of Systems   Constitutional:        My wife is pregnant possibly with twins. She is having some spotting but supposed to be ok. HENT: Negative. Eyes:        I have not seen the eye MD yet, I will go. Respiratory: Negative for cough, shortness of breath and wheezing. Cardiovascular: Negative for chest pain, palpitations and leg swelling. I dont feel fluttering or anything   Gastrointestinal:        I completed the H pylori treatment. Prilosec is more effective than nexium. Endocrine:        My BS run 's   Genitourinary: Negative. Musculoskeletal: Negative. Skin: Negative. Allergic/Immunologic: Negative. Neurological: Negative. Hematological: Negative. Psychiatric/Behavioral: Negative.         Objective:   Visit Vitals    /86    Pulse (!) 110    Temp 98.3 °F (36.8 °C)    Resp 20    Ht 5' 11\" (1.803 m)    Wt 283 lb 3.2 oz (128.5 kg)    SpO2 99%    BMI 39.5 kg/m2      Wt Readings from Last 3 Encounters:   12/04/17 283 lb 3.2 oz (128.5 kg)   10/17/17 308 lb (139.7 kg)   08/14/17 315 lb 12.8 oz (143.2 kg)     Lab Results   Component Value Date/Time    Glucose 273 08/02/2017 08:43 AM         Physical Exam   Constitutional: He is oriented to person, place, and time. He appears well-nourished. HENT:   Head: Atraumatic. Neck: Neck supple. Cardiovascular: Regular rhythm and normal heart sounds. Tachycardia present. Pulmonary/Chest: Effort normal and breath sounds normal.   Musculoskeletal: Normal range of motion. Neurological: He is alert and oriented to person, place, and time. Skin: Skin is warm and dry. Psychiatric: He has a normal mood and affect. His behavior is normal. Judgment and thought content normal.         Assessment:    Gillian Pop who has risk factors including (see above previous medical hx) and:       ICD-10-CM ICD-9-CM    1. Hypertension with goal blood pressure less than 130/80 I10 401.9 lisinopril-hydroCHLOROthiazide (PRINZIDE, ZESTORETIC) 20-12.5 mg per tablet      METABOLIC PANEL, COMPREHENSIVE   2. Dyslipidemia, goal LDL below 70 E78.5 272.4 LIPID PANEL   3. Type 2 diabetes mellitus with hyperglycemia, without long-term current use of insulin (HCC) E11.65 250.00 HEMOGLOBIN A1C WITH EAG     790.29 MICROALBUMIN, UR, RAND W/ MICROALBUMIN/CREA RATIO   4. Intractable migraine with aura with status migrainosus G43. 111 346.03 amitriptyline (ELAVIL) 25 mg tablet   5. Gastroesophageal reflux disease without esophagitis K21.9 530.81 dexlansoprazole (DEXILANT) 30 mg capsule       1. Hypertension with goal blood pressure less than 130/80  BP controlled under current therapy. No chg in therapy    - lisinopril-hydroCHLOROthiazide (PRINZIDE, ZESTORETIC) 20-12.5 mg per tablet; Take 1 Tab by mouth daily. For blood pressure  Dispense: 30 Tab; Refill: 3  - METABOLIC PANEL, COMPREHENSIVE; Future    2. Dyslipidemia, goal LDL below 70    - LIPID PANEL; Future    3.  Type 2 diabetes mellitus with hyperglycemia, without long-term current use of insulin ([facility]  Pts A1c dropped 2 points. [facility] same therapy    - HEMOGLOBIN A1C WITH EAG; Future  - MICROALBUMIN, UR, RAND W/ MICROALBUMIN/CREA RATIO; Future    4. Intractable migraine with aura with status migrainosus  Decreased elavil to 1/2 tab every day due to drowsiness. - amitriptyline (ELAVIL) 25 mg tablet; Take 0.5 Tabs by mouth nightly. For migraine prevention  Dispense: 30 Tab; Refill: 6    5. Gastroesophageal reflux disease without esophagitis  Pt to cont the Nexium until Dexilant is available via [facility]. Instructed pt to avoid foods that may stir up the reflux such as hot, spicy foods. Avoid laying down for 45 minutes after eating a meal.  Decrease the caffeine, acidic foods/drinks, elimination of alcohol and tobacco products. Discussed GERD treatment and the goal of it being a temporary measure while the patient improves their diet due to the risk of persistent GERD causing Munoz's esophagus which is precancerous and can cause osteoporosis. Patient verbalized understanding and will work on diet. This medication is provided on a temporary basis and advised of the risk of low magnesium which may cause muscle cramping and risk of osteoporosis and hypokalemia. - dexlansoprazole (DEXILANT) 30 mg capsule; Take 1 Cap by mouth daily as needed. For reflux  Dispense: 90 Cap; Refill: 3      Written instructions followed our verbal discussion of all information discussed above, pending tests ordered and future goals/plans. Patient expressed understanding of current diagnosis, planned testing, follow up and if needed to contact the office for any questions or concerns prior to the next visit. Plan:   Med reconciliation completed with patient. Reviewed side effects of medications with the patient. Questions were answered and patient verb understanding.       Discussed lab results with patient  Notes Recorded by Darshan King NP on 11/27/2017 at 7:31 AM  Will review results with pt at 12/4/17 appt  1) 8.4 to 6.8      Orders Placed This Encounter    HEMOGLOBIN A1C WITH EAG     Standing Status:   Future     Standing Expiration Date:   6/29/6570    METABOLIC PANEL, COMPREHENSIVE     Standing Status:   Future     Standing Expiration Date:   7/31/2018    MICROALBUMIN, UR, RAND W/ MICROALBUMIN/CREA RATIO     Standing Status:   Future     Standing Expiration Date:   7/31/2018    LIPID PANEL     Standing Status:   Future     Standing Expiration Date:   7/31/2018    amitriptyline (ELAVIL) 25 mg tablet     Sig: Take 0.5 Tabs by mouth nightly. For migraine prevention     Dispense:  30 Tab     Refill:  6    lisinopril-hydroCHLOROthiazide (PRINZIDE, ZESTORETIC) 20-12.5 mg per tablet     Sig: Take 1 Tab by mouth daily. For blood pressure     Dispense:  30 Tab     Refill:  3    dexlansoprazole (DEXILANT) 30 mg capsule     Sig: Take 1 Cap by mouth daily as needed. For reflux     Dispense:  90 Cap     Refill:  3     Current Outpatient Prescriptions   Medication Sig Dispense Refill    amitriptyline (ELAVIL) 25 mg tablet Take 0.5 Tabs by mouth nightly. For migraine prevention 30 Tab 6    lisinopril-hydroCHLOROthiazide (PRINZIDE, ZESTORETIC) 20-12.5 mg per tablet Take 1 Tab by mouth daily. For blood pressure 30 Tab 3    dexlansoprazole (DEXILANT) 30 mg capsule Take 1 Cap by mouth daily as needed. For reflux 90 Cap 3    terbinafine HCl (LAMISIL) 250 mg tablet Take 1 Tab by mouth daily. For foot fungus 30 Tab 2    ezetimibe-simvastatin (VYTORIN) 10-10 mg per tablet Take 1 Tab by mouth nightly. 90 Tab 3    esomeprazole (NEXIUM) 40 mg capsule Take 1 Cap by mouth daily as needed. For reflux 90 Cap 0    sAXagliptin-metFORMIN (KOMBIGLYZE XR) 2.5-1,000 mg TM24 Take 1 Tab by mouth daily. For diabetes 90 Tab 3    omega-3 acid ethyl esters (LOVAZA) 1 gram capsule Take 2 Caps by mouth two (2) times a day.  For triglycerides 360 Cap 3    Omeprazole delayed release (PRILOSEC D/R) 20 mg tablet Take 1 Tab by mouth two (2) times a day. Take for 10 days with the pylera for stomach infection 20 Tab 0       Follow-up Disposition:  Return in about 3 months (around 3/4/2018). No labs needed for 3 month follow-up appt  (6 months A1c, CMP, lipid, microalbumin- June 2018)    Dominique Whipple RN, MSN, [employer]   [address] [place] [address]      I spent 30 minutes with the patient in face-to-face consultation, of which greater than 50% was spent in counseling and coordination of care as described above.

## 2017-12-04 NOTE — PATIENT INSTRUCTIONS
[facility] reminders! Foundation Operating Hours: These may change without notice. Mon- Wed 7am to 5pm. Closed for lunch 12-1pm  Thurs 7am to 12pm  Fridays closed     Office number:     **In case of inclement weather (snow and/or ice) please do not try to come into the office for your appointment. Please call in and you will not be held as a [employer]. \" SAFETY FIRST!!**    NO SHOW POLICY ~ If a patient has 3 no shows for an appointment with the Provider, Mental Health Provider, or the Nurse Navigator, they will be discharged from the practice for 6 months. Medication ordering will also be suspended. If the patient is discharged from the [facility], they can go to the [place] [address] where they can be seen for their primary care needs plus obtain the same type of medications as they received at the [facility]. To avoid being discharged the patient must call the office at [phone] 24 hours prior to their appointment if they need to cancel or reschedule, arrive to their appointments on time (preferrably 15 minutes early) and come to all scheduled appointments with the provider, mental health, and/or nurse navigator. If the patient is discharged from the practice, they can apply to be re-established after 6 months. Lab work:    Unless you are instructed differently, please return to the office between the hours of 7 am and 10:30 am Monday through Thursday to have your labs drawn one week before your next scheduled PROVIDER appointment. If you do not have an appointment to follow up on these results, please make one or plan to call the office if you do not hear from us to get the results. No news does not mean good news. Medications:   Medication  times are firm:  Mondays and Tuesdays from 1pm-5pm  Wednesdays and Thursdays from 8am-11:30am  These hours are subject to change without notice. The [facility] or [facility] will assist you with your medications when available.  Not all medications are available and may be obtained through local pharmacies such as [place] [address] that has a large $4 list.     If your medications are new or have changed, and you get your medications from the [facility] [address] [place] [address]), you MUST talk to the pharmacy staff to sign the new prescription applications. If you don't sign the applications we cannot get the medications for you. It usually takes 6-8 weeks for your medications to arrive. The Pharmacy staff will call you when your medications are available. You will have 30 days to come in and  your medications. If you don't  your medicines within those 30 days, those medicines may be placed on the self as samples and you will have to start all over again by completing the applications and waiting the 6-8 weeks for your medicines to arrive. Foot Care: [facility] through [facility] ([address] [facility])  Every second Tuesday of the month (except for holidays and election days) from 9am to 1 pm. The services provided by these ministry volunteers are free of charge with the option to donate. They will inspect your feet thoroughly, soak them for 10 minutes, cut and file your nails. They care for diabetics as well. Keep in mind this service is free and will be on a first come first serve basis. Bad teeth? Ask about the Dental Clinic to get you in front of a local dentist when a dentist is available. The bus leaves the second Thursday of the month for those on the list. (Ask about availability as these appointments are limited). DIABETES:   Do you have uncontrolled diabetes or you just want to learn more about your diabetes? Schedule with the Nurse navigator for our new 5-week Diabetes program. You will learn how to properly manage your diabetes: nutrition, exercise, medication therapy. Eye exams for Diabetics. Please let us know so we can add you to the list to see the eye doctor at [facility].  These appointments are limited. You will receive a free eye exam and free glasses if needed. Unfortunately, if you are not a diabetic, we do not have a free service for eye exams for you (yet!). We do have information on where to go to get a huge discount on eye exams and glasses. Sick visits: If you are sick and it is not an emergency call the office to schedule an appointment to see the provider. Care in the office is FREE but charges will be applied if you go to the Emergency room. Charges and cost items from the Foundation:    Most of our orders are covered by [address] [place] [address] Eusebio but there ARE SOME CHARGES for items such as radiology interpretations and anesthesiology during procedures and surgeries that are not covered under Nayeli Peñaloza. Advanced Patient Advocacy (APA)   Please make sure you have contacted the APA group to check on your payment options: www. [email or web address]. APA is available Mon - Fri 8-4pm at [facility] on the first floor by the information desk. Their number is [phone]. It is important that you are screened in order to qualify for assistance and to avoid huge medical charges. The [facility] is not responsible for ANY charges you may accrue regardless of who ordered the medication, procedure, treatment or test. If you go to the Emergency Room, you WILL be charged! Behavior and emotional issues! It is stressful to be sick, have an illness, take medications, not have a job, not have medical insurance, have family issues or just getting older! Schedule an appointment with our mental health provider. She is in the office Mondays and Wednesdays from 8am to 99957 [facility] can also contact the following:     The national suicide hotline (4-263-661-OHEE or 7-520.470.8804)    [address], [address] [place] [address] [place] [address]) - [address] [place] [address]   [employer], [address]  [phone]    Drug and Alcohol Addiction Issues! It is hard to stop a poor habit but there is help out there. Please feel free to attend any other the following support groups to help you kick the habit or go to [facility] Emergency Department to be evaluated by the psychiatric team. Never give up!!     George meetings: [facility] MONDAY 10:30 AM [employer] [address] 8:00  [facility] [address] [place] [address]

## 2017-12-09 ENCOUNTER — HOSPITAL ENCOUNTER (EMERGENCY)
Age: 37
Discharge: HOME | End: 2017-12-09
Attending: EMERGENCY MEDICINE
Payer: SUBSIDIZED

## 2017-12-09 VITALS
HEIGHT: 71 IN | TEMPERATURE: 98.4 F | SYSTOLIC BLOOD PRESSURE: 158 MMHG | RESPIRATION RATE: 18 BRPM | WEIGHT: 289 LBS | HEART RATE: 70 BPM | BODY MASS INDEX: 40.46 KG/M2 | OXYGEN SATURATION: 100 % | DIASTOLIC BLOOD PRESSURE: 96 MMHG

## 2017-12-09 DIAGNOSIS — R21 RASH: Primary | ICD-10-CM

## 2017-12-09 DIAGNOSIS — K13.0 DRY LIPS: ICD-10-CM

## 2017-12-09 PROCEDURE — 99282 EMERGENCY DEPT VISIT SF MDM: CPT

## 2017-12-09 PROCEDURE — 86592 SYPHILIS TEST NON-TREP QUAL: CPT | Performed by: PHYSICIAN ASSISTANT

## 2017-12-09 NOTE — ED TRIAGE NOTES
Patient states that 2-3 days ago he some bumps on his hands   States that his hands are still burning and itching. His lips got real dry and burning and itching. States that S/S are getting a little better.

## 2017-12-09 NOTE — DISCHARGE INSTRUCTIONS
Rash: Care Instructions  Your Care Instructions  A rash is any irritation or inflammation of the skin. Rashes have many possible causes, including allergy, infection, illness, heat, and emotional stress. Follow-up care is a key part of your treatment and safety. Be sure to make and go to all appointments, and call your doctor if you are having problems. It's also a good idea to know your test results and keep a list of the medicines you take. How can you care for yourself at home? · Wash the area with water only. Soap can make dryness and itching worse. Pat dry. · Put cold, wet cloths on the rash to reduce itching. · Keep cool, and stay out of the sun. · Leave the rash open to the air as much of the time as possible. · Sometimes petroleum jelly (Vaseline) can help relieve the discomfort caused by a rash. A moisturizing lotion, such as Cetaphil, also may help. Calamine lotion may help for rashes caused by contact with something (such as a plant or soap) that irritated the skin. Use it 3 or 4 times a day. · If your doctor prescribed a cream, use it as directed. If your doctor prescribed medicine, take it exactly as directed. · If your rash itches so badly that it interferes with your normal activities, take an over-the-counter antihistamine, such as diphenhydramine (Benadryl) or loratadine (Claritin). Read and follow all instructions on the label. When should you call for help? Call your doctor now or seek immediate medical care if:  ? · You have signs of infection, such as:  ¨ Increased pain, swelling, warmth, or redness. ¨ Red streaks leading from the area. ¨ Pus draining from the area. ¨ A fever. ? · You have joint pain along with the rash. ? Watch closely for changes in your health, and be sure to contact your doctor if:  ? · Your rash is changing or getting worse. For example, call if you have pain along with the rash, the rash is spreading, or you have new blisters.    ? · You do not get better after 1 week. Where can you learn more? Go to http://judith-alex.info/. Enter G308 in the search box to learn more about \"Rash: Care Instructions. \"  Current as of: October 13, 2016  Content Version: 11.4  © 8172-3136 Healthwise, [employer]. Care instructions adapted under license by [email or web address] (which disclaims liability or warranty for this information). If you have questions about a medical condition or this instruction, always ask your healthcare professional. [address] any warranty or liability for your use of this information.

## 2017-12-09 NOTE — ED PROVIDER NOTES
EMERGENCY DEPARTMENT HISTORY AND PHYSICAL EXAM    11:24 AM      Date: 12/09/2017  Patient Name: Shruti Espinoza    History of Presenting Illness     Chief Complaint   Patient presents with    Skin Problem         History Provided By: Patient    Chief Complaint: Rash  Duration:  Days  Timing:  Improving  Location: Bilateral hands   Quality: Burning and pruritic   Modifying Factors: Took Benadryl and used Hydrocortisone cream with improvement   Associated Symptoms: Dry lips      Additional History (Context):     Shruti Espinoza is a 39 y.o. male with a pertinent history of HTN, HLD, DM type II, GERD, migraines who presents to the ED c/o an improving pruritic rash to bilateral hands x 2-3 days. States hands burn when he places his hands in water. Took Benadryl and used OTC Hydrocortisone cream with significant improvement, rash is almost completely resolved but hands still burn a bit. Also reports dry, cracking, burning lips. Pt states he used ChapStick and increased his water intake also with no improvement to dry lips. Pt denies lip swelling, changes no medications, new foods, new soaps or detergents. No new chemicals used at work. Pt notes he wears gloves when working. Reports that he is sexually active. No other acute symptoms or complaints were noted. Says his blood sugar has been in a good range and his A1C actually has dropped 2 points. PCP: Sondra Ortega NP    Current Outpatient Prescriptions   Medication Sig Dispense Refill    amitriptyline (ELAVIL) 25 mg tablet Take 0.5 Tabs by mouth nightly. For migraine prevention 30 Tab 6    lisinopril-hydroCHLOROthiazide (PRINZIDE, ZESTORETIC) 20-12.5 mg per tablet Take 1 Tab by mouth daily. For blood pressure 30 Tab 3    dexlansoprazole (DEXILANT) 30 mg capsule Take 1 Cap by mouth daily as needed. For reflux 90 Cap 3    ezetimibe-simvastatin (VYTORIN) 10-10 mg per tablet Take 1 Tab by mouth nightly.  For cholesterol 90 Tab 3    terbinafine HCl (LAMISIL) 250 mg tablet Take 1 Tab by mouth daily. For foot fungus 30 Tab 2    esomeprazole (NEXIUM) 40 mg capsule Take 1 Cap by mouth daily as needed. For reflux 90 Cap 0    sAXagliptin-metFORMIN (KOMBIGLYZE XR) 2.5-1,000 mg TM24 Take 1 Tab by mouth daily. For diabetes 90 Tab 3    omega-3 acid ethyl esters (LOVAZA) 1 gram capsule Take 2 Caps by mouth two (2) times a day. For triglycerides 360 Cap 3       Past History     Past Medical History:  Past Medical History:   Diagnosis Date    Alcohol use 8/7/2017    BMI 40.0-44.9, adult ([facility] [address] 8/7/2017    Elevated serum alkaline phosphatase level 8/7/2017    Gastroesophageal reflux disease without esophagitis 8/7/2017    GERD (gastroesophageal reflux disease)     H/O seasonal allergies     Hepatitis C antibody test positive 08/2017    HCV Not detected    Hypertension     Hypertension with goal blood pressure less than 130/80 8/7/2017    Intractable migraine with aura with status migrainosus 8/7/2017    Migraine     Type 2 diabetes mellitus treated without insulin ([facility] 8/7/2017       Past Surgical History:  History reviewed. No pertinent surgical history. Family History:  Family History   Problem Relation Age of Onset    Hypertension Mother     Diabetes Mother     Hypertension Father     Diabetes Father     Hypertension Sister     Asthma Sister     Breast Cancer Maternal Aunt     Lung Disease Maternal Aunt      lung cancer       Social History:  Social History   Substance Use Topics    Smoking status: Current Every Day Smoker     Packs/day: 1.00    Smokeless tobacco: Never Used      Comment: e- cigarette user    Alcohol use Yes      Comment: occasional       Allergies:  No Known Allergies      Review of Systems       Review of Systems   HENT: Negative for facial swelling. Skin: Positive for rash. All other systems reviewed and are negative. Physical Exam     Visit Vitals    BP (!) 158/96 (BP 1 Location: Left arm, BP Patient Position:  At rest;Sitting)    Pulse 70    Temp 98.4 °F (36.9 °C)    Resp 18    SpO2 100%         Physical Exam   Constitutional: He is oriented to person, place, and time. He appears well-developed and well-nourished. HENT:   Head: Normocephalic and atraumatic. Mouth/Throat: Oropharynx is clear and moist. No oropharyngeal exudate. Mucus membranes moist, lips minimally dry and cracked. Eyes: Conjunctivae are normal. Right eye exhibits no discharge. Left eye exhibits no discharge. Neck: Neck supple. Cardiovascular: Normal rate, regular rhythm and normal heart sounds. Exam reveals no gallop and no friction rub. No murmur heard. Pulmonary/Chest: Effort normal and breath sounds normal. No respiratory distress. He has no wheezes. He has no rales. Neurological: He is alert and oriented to person, place, and time. He exhibits normal muscle tone. Skin: Skin is warm and dry. No pallor. Few skin colored papules noted to dorsal bilateral hands, slight erythema noted to bilateral palmar surfaces. No other rash present. Psychiatric: He has a normal mood and affect. His behavior is normal. Judgment and thought content normal.   Nursing note and vitals reviewed. Diagnostic Study Results     Labs -  Will obtain RPR as rash was present on his bilateral palms. Medical Decision Making   I am the first provider for this patient. I reviewed the vital signs, available nursing notes, past medical history, past surgical history, family history and social history. Vital Signs-Reviewed the patient's vital signs.     Pulse Oximetry Analysis -  100% on room air (Interpretation)    Records Reviewed: Nursing Notes and Old Medical Records (Time of Review: 11:24 AM)    ED Course: Progress Notes, Reevaluation, and Consults:      Provider Notes (Medical Decision Making): Pt had a rash to his bilateral hands, which is now mostly resolved after taking Benadryl and using Hydrocortisone cream.  Says his hands still burn a bit when he runs them under warm water. Also has slight cracking/dryness to his lips. He does not have any swelling to his lips or oropharynx. Unlikely syphillis, but given that it is palmar, will obtain RPR. Mucus membranes are moist and pt states his BS has been in a good range. Will not check BG. Plan for him to continue hydrocortisone cream as his hands are mostly resolved and may try OTC Dr. Alison Johnson for his lips. He is a  at work but does not recall any chemical contact and does not recall any new foods/products, etc.  Plan for f/u with PCP. Strict instructions to return for any worsening. Diagnosis     Clinical Impression:   1. Rash    2. Dry lips        Disposition: Discharged     Follow-up Information     Follow up With Details Comments [address], NP In 3 days  [facility] [address]      [facility]  If symptoms worsen [address] [place] [id number]  [phone]           Patient's Medications   Start Taking    No medications on file   Continue Taking    AMITRIPTYLINE (ELAVIL) 25 MG TABLET    Take 0.5 Tabs by mouth nightly. For migraine prevention    DEXLANSOPRAZOLE (DEXILANT) 30 MG CAPSULE    Take 1 Cap by mouth daily as needed. For reflux    ESOMEPRAZOLE (NEXIUM) 40 MG CAPSULE    Take 1 Cap by mouth daily as needed. For reflux    EZETIMIBE-SIMVASTATIN (VYTORIN) 10-10 MG PER TABLET    Take 1 Tab by mouth nightly. For cholesterol    LISINOPRIL-HYDROCHLOROTHIAZIDE (PRINZIDE, ZESTORETIC) 20-12.5 MG PER TABLET    Take 1 Tab by mouth daily. For blood pressure    OMEGA-3 ACID ETHYL ESTERS (LOVAZA) 1 GRAM CAPSULE    Take 2 Caps by mouth two (2) times a day. For triglycerides    SAXAGLIPTIN-METFORMIN (KOMBIGLYZE XR) 2.5-1,000 MG TM24    Take 1 Tab by mouth daily. For diabetes    TERBINAFINE HCL (LAMISIL) 250 MG TABLET    Take 1 Tab by mouth daily.  For foot fungus   These Medications have changed    No medications on file   Stop Taking    No medications on file     _______________________________    Attestations:  Scribe Attestation     Manuel Pulido acting as a scribe for and in the presence of Cesar Smith PA-C      December 09, 2017 at 11:26 AM       Provider Attestation:      I personally performed the services described in the documentation, reviewed the documentation, as recorded by the scribe in my presence, and it accurately and completely records my words and actions.  December 09, 2017 at 11:26 AM - Cesar Smith PA-C    _______________________________    GENARO Strong

## 2017-12-10 LAB — RPR SER QL: NONREACTIVE

## 2017-12-13 ENCOUNTER — OFFICE VISIT (OUTPATIENT)
Dept: FAMILY MEDICINE CLINIC | Age: 37
End: 2017-12-13

## 2017-12-13 VITALS
DIASTOLIC BLOOD PRESSURE: 89 MMHG | TEMPERATURE: 98.4 F | OXYGEN SATURATION: 98 % | SYSTOLIC BLOOD PRESSURE: 143 MMHG | HEART RATE: 107 BPM | RESPIRATION RATE: 12 BRPM

## 2017-12-13 DIAGNOSIS — Z71.89 DIABETES EDUCATION, ENCOUNTER FOR: Primary | ICD-10-CM

## 2017-12-13 DIAGNOSIS — R21 RASH OF HANDS: ICD-10-CM

## 2017-12-13 NOTE — PROGRESS NOTES
SUBJECTIVE:  44 y.o. male for follow up of diabetes. Diabetic Review of Systems - medication compliance: compliant most of the time, diabetic diet compliance: noncompliant some of the time, further diabetic ROS: no polyuria or polydipsia, no chest pain, dyspnea or TIA's, no numbness, tingling or pain in extremities, no medication side effects noted. Other symptoms and concerns: rash to hands, dry lips (seen in ED for same this week). Current Outpatient Prescriptions   Medication Sig Dispense Refill    amitriptyline (ELAVIL) 25 mg tablet Take 0.5 Tabs by mouth nightly. For migraine prevention 30 Tab 6    lisinopril-hydroCHLOROthiazide (PRINZIDE, ZESTORETIC) 20-12.5 mg per tablet Take 1 Tab by mouth daily. For blood pressure 30 Tab 3    dexlansoprazole (DEXILANT) 30 mg capsule Take 1 Cap by mouth daily as needed. For reflux 90 Cap 3    ezetimibe-simvastatin (VYTORIN) 10-10 mg per tablet Take 1 Tab by mouth nightly. For cholesterol 90 Tab 3    terbinafine HCl (LAMISIL) 250 mg tablet Take 1 Tab by mouth daily. For foot fungus 30 Tab 2    esomeprazole (NEXIUM) 40 mg capsule Take 1 Cap by mouth daily as needed. For reflux 90 Cap 0    sAXagliptin-metFORMIN (KOMBIGLYZE XR) 2.5-1,000 mg TM24 Take 1 Tab by mouth daily. For diabetes 90 Tab 3    omega-3 acid ethyl esters (LOVAZA) 1 gram capsule Take 2 Caps by mouth two (2) times a day. For triglycerides 360 Cap 3       OBJECTIVE:  Appearance: alert, well appearing, and in no distress, oriented to person, place, and time and overweight. Visit Vitals    /89 (BP 1 Location: Right arm)    Pulse (!) 107    Temp 98.4 °F (36.9 °C)    Resp 12    SpO2 98%       Exam: chest clear    ASSESSMENT:  Diabetes Mellitus: control uncertain    PLAN: Mr. Jazmin Wilsonsin comes every week to see me but does not bring DM log or meter.    Issues reviewed with him: home glucose monitoring emphasized, long term diabetic complications discussed and patient urged in the strongest terms to quit smoking.

## 2018-01-22 ENCOUNTER — TELEPHONE (OUTPATIENT)
Dept: FAMILY MEDICINE CLINIC | Age: 38
End: 2018-01-22

## 2018-01-22 NOTE — TELEPHONE ENCOUNTER
Medication: Dexilant 30mg  , dose: 1 tab, how often: every day as needed , current number of medication days provided: 90, refill per application. Lot #: [id number], EXP 01/2019. This medication was received and verified for the following 1. Correct Patient, 2. Correct Diagnosis, 3. Correct Drug, 4. Correct route, and no current allergy to medication. Please contact patient to come  their medications.      Bebe Araujo, MSN,RN,[facility]

## 2018-02-12 ENCOUNTER — TELEPHONE (OUTPATIENT)
Dept: FAMILY MEDICINE CLINIC | Age: 38
End: 2018-02-12

## 2018-02-12 NOTE — TELEPHONE ENCOUNTER
Medication: Vytorin 10/10 , dose: 1 tab, how often: daily , current number of medication days provided: 90, refill per application. Lot #: [id number], EXP 02/2019. This medication was received and verified for the following 1. Correct Patient, 2. Correct Diagnosis, 3. Correct Drug, 4. Correct route, and no current allergy to medication. Please contact patient to come  their medications.      Dorothea Franz, MSN,RN,[facility]

## 2018-02-22 ENCOUNTER — TELEPHONE (OUTPATIENT)
Dept: FAMILY MEDICINE CLINIC | Age: 38
End: 2018-02-22

## 2018-02-22 NOTE — TELEPHONE ENCOUNTER
Medication: Kombiglyze 2.5/1000, dose: 1 tab, how often: qd , current number of medication days provided: 90, refill per application. Lot #: [id number], EXP 02/2019. This medication was received and verified for the following 1. Correct Patient, 2. Correct Diagnosis, 3. Correct Drug, 4. Correct route, and no current allergy to medication. Please contact patient to come  their medications.      ILANA Scott,RN,AGNP-C     [facility]

## 2018-02-24 ENCOUNTER — APPOINTMENT (OUTPATIENT)
Dept: GENERAL RADIOLOGY | Age: 38
End: 2018-02-24
Attending: EMERGENCY MEDICINE
Payer: SUBSIDIZED

## 2018-02-24 ENCOUNTER — HOSPITAL ENCOUNTER (EMERGENCY)
Age: 38
Discharge: HOME | End: 2018-02-25
Attending: EMERGENCY MEDICINE
Payer: SUBSIDIZED

## 2018-02-24 DIAGNOSIS — R50.9 FEVER, UNSPECIFIED FEVER CAUSE: Primary | ICD-10-CM

## 2018-02-24 LAB
ALBUMIN SERPL-MCNC: 3.7 G/DL (ref 3.4–5)
ALBUMIN/GLOB SERPL: 0.9 {RATIO} (ref 0.8–1.7)
ALP SERPL-CCNC: 121 U/L (ref 45–117)
ALT SERPL-CCNC: 13 U/L (ref 16–61)
ANION GAP SERPL CALC-SCNC: 9 MMOL/L (ref 3–18)
APPEARANCE UR: CLEAR
AST SERPL-CCNC: 26 U/L (ref 15–37)
BACTERIA URNS QL MICRO: NEGATIVE /HPF
BASOPHILS # BLD: 0 K/UL (ref 0–0.1)
BASOPHILS NFR BLD: 0 % (ref 0–2)
BILIRUB SERPL-MCNC: 0.7 MG/DL (ref 0.2–1)
BILIRUB UR QL: ABNORMAL
BUN SERPL-MCNC: 9 MG/DL (ref 7–18)
BUN/CREAT SERPL: 8 (ref 12–20)
CALCIUM SERPL-MCNC: 8.9 MG/DL (ref 8.5–10.1)
CHLORIDE SERPL-SCNC: 98 MMOL/L (ref 100–108)
CK MB CFR SERPL CALC: NORMAL % (ref 0–4)
CK MB SERPL-MCNC: <1 NG/ML (ref 5–25)
CK SERPL-CCNC: 217 U/L (ref 39–308)
CO2 SERPL-SCNC: 27 MMOL/L (ref 21–32)
COLOR UR: ABNORMAL
CREAT SERPL-MCNC: 1.06 MG/DL (ref 0.6–1.3)
DIFFERENTIAL METHOD BLD: ABNORMAL
EOSINOPHIL # BLD: 0 K/UL (ref 0–0.4)
EOSINOPHIL NFR BLD: 0 % (ref 0–5)
EPITH CASTS URNS QL MICRO: ABNORMAL /LPF (ref 0–5)
ERYTHROCYTE [DISTWIDTH] IN BLOOD BY AUTOMATED COUNT: 13.1 % (ref 11.6–14.5)
GLOBULIN SER CALC-MCNC: 3.9 G/DL (ref 2–4)
GLUCOSE SERPL-MCNC: 114 MG/DL (ref 74–99)
GLUCOSE UR STRIP.AUTO-MCNC: NEGATIVE MG/DL
HCT VFR BLD AUTO: 40.6 % (ref 36–48)
HGB BLD-MCNC: 13 G/DL (ref 13–16)
HGB UR QL STRIP: NEGATIVE
KETONES UR QL STRIP.AUTO: ABNORMAL MG/DL
LEUKOCYTE ESTERASE UR QL STRIP.AUTO: NEGATIVE
LYMPHOCYTES # BLD: 0.8 K/UL (ref 0.9–3.6)
LYMPHOCYTES NFR BLD: 6 % (ref 21–52)
MCH RBC QN AUTO: 26.7 PG (ref 24–34)
MCHC RBC AUTO-ENTMCNC: 32 G/DL (ref 31–37)
MCV RBC AUTO: 83.5 FL (ref 74–97)
MONOCYTES # BLD: 0.7 K/UL (ref 0.05–1.2)
MONOCYTES NFR BLD: 6 % (ref 3–10)
MUCOUS THREADS URNS QL MICRO: ABNORMAL /LPF
NEUTS SEG # BLD: 11 K/UL (ref 1.8–8)
NEUTS SEG NFR BLD: 88 % (ref 40–73)
NITRITE UR QL STRIP.AUTO: NEGATIVE
PH UR STRIP: 6 [PH] (ref 5–8)
PLATELET # BLD AUTO: 300 K/UL (ref 135–420)
PMV BLD AUTO: 9.9 FL (ref 9.2–11.8)
POTASSIUM SERPL-SCNC: 4.4 MMOL/L (ref 3.5–5.5)
PROT SERPL-MCNC: 7.6 G/DL (ref 6.4–8.2)
PROT UR STRIP-MCNC: 30 MG/DL
RBC # BLD AUTO: 4.86 M/UL (ref 4.7–5.5)
RBC #/AREA URNS HPF: ABNORMAL /HPF (ref 0–5)
SODIUM SERPL-SCNC: 134 MMOL/L (ref 136–145)
SP GR UR REFRACTOMETRY: >1.03 (ref 1–1.03)
TROPONIN I SERPL-MCNC: <0.02 NG/ML (ref 0–0.04)
UROBILINOGEN UR QL STRIP.AUTO: 2 EU/DL (ref 0.2–1)
WBC # BLD AUTO: 12.5 K/UL (ref 4.6–13.2)
WBC URNS QL MICRO: ABNORMAL /HPF (ref 0–5)

## 2018-02-24 PROCEDURE — 87040 BLOOD CULTURE FOR BACTERIA: CPT | Performed by: EMERGENCY MEDICINE

## 2018-02-24 PROCEDURE — 81001 URINALYSIS AUTO W/SCOPE: CPT | Performed by: EMERGENCY MEDICINE

## 2018-02-24 PROCEDURE — 93005 ELECTROCARDIOGRAM TRACING: CPT

## 2018-02-24 PROCEDURE — 85025 COMPLETE CBC W/AUTO DIFF WBC: CPT | Performed by: EMERGENCY MEDICINE

## 2018-02-24 PROCEDURE — 99285 EMERGENCY DEPT VISIT HI MDM: CPT

## 2018-02-24 PROCEDURE — 80053 COMPREHEN METABOLIC PANEL: CPT | Performed by: EMERGENCY MEDICINE

## 2018-02-24 PROCEDURE — 96360 HYDRATION IV INFUSION INIT: CPT

## 2018-02-24 PROCEDURE — 82550 ASSAY OF CK (CPK): CPT | Performed by: EMERGENCY MEDICINE

## 2018-02-24 PROCEDURE — 87804 INFLUENZA ASSAY W/OPTIC: CPT | Performed by: EMERGENCY MEDICINE

## 2018-02-24 PROCEDURE — 74011250636 HC RX REV CODE- 250/636: Performed by: EMERGENCY MEDICINE

## 2018-02-24 PROCEDURE — 71045 X-RAY EXAM CHEST 1 VIEW: CPT

## 2018-02-24 PROCEDURE — 74011250637 HC RX REV CODE- 250/637: Performed by: EMERGENCY MEDICINE

## 2018-02-24 RX ORDER — SODIUM CHLORIDE 0.9 % (FLUSH) 0.9 %
5-10 SYRINGE (ML) INJECTION AS NEEDED
Status: DISCONTINUED | OUTPATIENT
Start: 2018-02-24 | End: 2018-02-25 | Stop reason: HOSPADM

## 2018-02-24 RX ORDER — ACETAMINOPHEN 500 MG
1000 TABLET ORAL ONCE
Status: COMPLETED | OUTPATIENT
Start: 2018-02-24 | End: 2018-02-24

## 2018-02-24 RX ADMIN — SODIUM CHLORIDE 1000 ML: 900 INJECTION, SOLUTION INTRAVENOUS at 23:00

## 2018-02-24 RX ADMIN — SODIUM CHLORIDE 3879 ML: 900 INJECTION, SOLUTION INTRAVENOUS at 23:51

## 2018-02-24 RX ADMIN — ACETAMINOPHEN 1000 MG: 500 TABLET ORAL at 23:17

## 2018-02-25 VITALS
OXYGEN SATURATION: 96 % | RESPIRATION RATE: 14 BRPM | HEIGHT: 71 IN | DIASTOLIC BLOOD PRESSURE: 75 MMHG | WEIGHT: 285 LBS | HEART RATE: 92 BPM | BODY MASS INDEX: 39.9 KG/M2 | TEMPERATURE: 99.9 F | SYSTOLIC BLOOD PRESSURE: 130 MMHG

## 2018-02-25 LAB
ATRIAL RATE: 122 BPM
CALCULATED P AXIS, ECG09: 56 DEGREES
CALCULATED R AXIS, ECG10: 34 DEGREES
CALCULATED T AXIS, ECG11: 40 DEGREES
DIAGNOSIS, 93000: NORMAL
FLUAV AG NPH QL IA: NEGATIVE
FLUBV AG NOSE QL IA: NEGATIVE
LACTATE BLD-SCNC: 1.2 MMOL/L (ref 0.4–2)
P-R INTERVAL, ECG05: 136 MS
Q-T INTERVAL, ECG07: 306 MS
QRS DURATION, ECG06: 84 MS
QTC CALCULATION (BEZET), ECG08: 436 MS
VENTRICULAR RATE, ECG03: 122 BPM

## 2018-02-25 PROCEDURE — 83605 ASSAY OF LACTIC ACID: CPT

## 2018-02-25 PROCEDURE — 96361 HYDRATE IV INFUSION ADD-ON: CPT

## 2018-02-25 NOTE — ED NOTES
Reviewed DC instructions with patient. Pt verbalized an understanding. Pt instructed to follow up with PCP this week. Pt signed paper DC instructions; RN placed in scan bin. Pt left ED with no distress noted and all belongings.

## 2018-02-25 NOTE — ED TRIAGE NOTES
Pt stating that he started feeling SOB and dizzy about two hours ago running errands. Pt is AOX4; Pt placed on monitor. Pt has family bedside.

## 2018-02-25 NOTE — ED PROVIDER NOTES
EMERGENCY DEPARTMENT HISTORY AND PHYSICAL EXAM    10:36 PM      Date: 2/24/2018  Patient Name: Nano Richardson    History of Presenting Illness     Chief Complaint   Patient presents with    Shortness of Breath    Dizziness         History Provided By: Patient    Chief Complaint: CP  Duration:  Minutes  Timing:  Acute  Location: chest  Quality: Tightness  Severity: Severe  Modifying Factors: none  Associated Symptoms: left arm pain, HA, nausea, vomiting, diaphresis      Additional History (Context): Nano Richardson is a 44 y.o. male with HTN, DM type 2, GERD, migraines who presents with left sided CP that radiates into his left arm that feels like a tightness that happened shortly before presenting to the ED. The pt states he was in the store when he began to feel got and sweaty. The pt reports nausea with 1 episode of vomiting. While presenting the pt reports having a HA with intermittent chills. Before his symptoms the pt says he was feeling normal through out the day. Reports hx of GERD, DM, and HTN; says he has been taking all of his medications as prescribed. No hx of MI. The pt denies SOB, cough, leg swelling, diarrhea, abdominal pain, diarrhea, and back pain. The pt had no other complaints or concerns in the ED. PCP: Ziggy Anne NP    Current Facility-Administered Medications   Medication Dose Route Frequency Provider Last Rate Last Dose    sodium chloride (NS) flush 5-10 mL  5-10 mL IntraVENous PRN Regina Solo MD         Current Outpatient Prescriptions   Medication Sig Dispense Refill    amitriptyline (ELAVIL) 25 mg tablet Take 0.5 Tabs by mouth nightly. For migraine prevention 30 Tab 6    lisinopril-hydroCHLOROthiazide (PRINZIDE, ZESTORETIC) 20-12.5 mg per tablet Take 1 Tab by mouth daily. For blood pressure 30 Tab 3    dexlansoprazole (DEXILANT) 30 mg capsule Take 1 Cap by mouth daily as needed.  For reflux 90 Cap 3    ezetimibe-simvastatin (VYTORIN) 10-10 mg per tablet Take 1 Tab by mouth nightly. For cholesterol 90 Tab 3    terbinafine HCl (LAMISIL) 250 mg tablet Take 1 Tab by mouth daily. For foot fungus 30 Tab 2    esomeprazole (NEXIUM) 40 mg capsule Take 1 Cap by mouth daily as needed. For reflux 90 Cap 0    sAXagliptin-metFORMIN (KOMBIGLYZE XR) 2.5-1,000 mg TM24 Take 1 Tab by mouth daily. For diabetes 90 Tab 3    omega-3 acid ethyl esters (LOVAZA) 1 gram capsule Take 2 Caps by mouth two (2) times a day. For triglycerides 360 Cap 3       Past History     Past Medical History:  Past Medical History:   Diagnosis Date    Alcohol use 8/7/2017    BMI 40.0-44.9, adult ([facility] [address] 8/7/2017    Elevated serum alkaline phosphatase level 8/7/2017    Gastroesophageal reflux disease without esophagitis 8/7/2017    GERD (gastroesophageal reflux disease)     H/O seasonal allergies     Hepatitis C antibody test positive 08/2017    HCV Not detected    Hypertension     Hypertension with goal blood pressure less than 130/80 8/7/2017    Intractable migraine with aura with status migrainosus 8/7/2017    Migraine     Type 2 diabetes mellitus treated without insulin ([facility] [address] 8/7/2017       Past Surgical History:  History reviewed. No pertinent surgical history. Family History:  Family History   Problem Relation Age of Onset    Hypertension Mother     Diabetes Mother     Hypertension Father     Diabetes Father     Hypertension Sister     Asthma Sister     Breast Cancer Maternal Aunt     Lung Disease Maternal Aunt      lung cancer       Social History:  Social History   Substance Use Topics    Smoking status: Current Every Day Smoker     Packs/day: 1.00    Smokeless tobacco: Never Used      Comment: e- cigarette user    Alcohol use Yes      Comment: occasional       Allergies:  No Known Allergies      Review of Systems       Review of Systems   Constitutional: Positive for chills and diaphoresis. Negative for fever. Respiratory: Negative for shortness of breath.     Cardiovascular: Positive for chest pain. Gastrointestinal: Positive for nausea and vomiting. Negative for diarrhea. Neurological: Positive for headaches. All other systems reviewed and are negative. Physical Exam     Visit Vitals    /72 (BP 1 Location: Left arm, BP Patient Position: At rest)    Pulse (!) 109    Temp 99.9 °F (37.7 °C)    Resp 25    Ht 5' 11\" (1.803 m)    Wt 129.3 kg (285 lb)    SpO2 99%    BMI 39.75 kg/m2       Physical Exam   Constitutional: He is oriented to person, place, and time. He appears well-developed and well-nourished. No distress. Restless. HENT:   Head: Normocephalic and atraumatic. Eyes: Conjunctivae and EOM are normal. Right eye exhibits no discharge. Left eye exhibits no discharge. No scleral icterus. Neck: Normal range of motion. Neck supple. No tracheal deviation present. Cardiovascular: Regular rhythm and normal heart sounds. Tachycardia present. No murmur heard. Pulmonary/Chest: Effort normal and breath sounds normal. No respiratory distress. He has no wheezes. He has no rales. Abdominal: Soft. He exhibits no distension. There is no tenderness. There is no rebound and no guarding. Musculoskeletal: Normal range of motion. He exhibits no edema or deformity. Neurological: He is alert and oriented to person, place, and time. No cranial nerve deficit. Skin: Skin is warm and dry. He is not diaphoretic. Warm to touch. Psychiatric: He has a normal mood and affect. His behavior is normal. Judgment and thought content normal.   Nursing note and vitals reviewed.         Diagnostic Study Results     Labs -  Recent Results (from the past 12 hour(s))   CBC WITH AUTOMATED DIFF    Collection Time: 02/24/18 10:56 PM   Result Value Ref Range    WBC 12.5 4.6 - 13.2 K/uL    RBC 4.86 4.70 - 5.50 M/uL    HGB 13.0 13.0 - 16.0 g/dL    HCT 40.6 36.0 - 48.0 %    MCV 83.5 74.0 - 97.0 FL    MCH 26.7 24.0 - 34.0 PG    MCHC 32.0 31.0 - 37.0 g/dL    RDW 13.1 11.6 - 14.5 %    PLATELET 300 135 - 420 K/uL    MPV 9.9 9.2 - 11.8 FL    NEUTROPHILS 88 (H) 40 - 73 %    LYMPHOCYTES 6 (L) 21 - 52 %    MONOCYTES 6 3 - 10 %    EOSINOPHILS 0 0 - 5 %    BASOPHILS 0 0 - 2 %    ABS. NEUTROPHILS 11.0 (H) 1.8 - 8.0 K/UL    ABS. LYMPHOCYTES 0.8 (L) 0.9 - 3.6 K/UL    ABS. MONOCYTES 0.7 0.05 - 1.2 K/UL    ABS. EOSINOPHILS 0.0 0.0 - 0.4 K/UL    ABS. BASOPHILS 0.0 0.0 - 0.1 K/UL    DF AUTOMATED     METABOLIC PANEL, COMPREHENSIVE    Collection Time: 02/24/18 10:56 PM   Result Value Ref Range    Sodium 134 (L) 136 - 145 mmol/L    Potassium 4.4 3.5 - 5.5 mmol/L    Chloride 98 (L) 100 - 108 mmol/L    CO2 27 21 - 32 mmol/L    Anion gap 9 3.0 - 18 mmol/L    Glucose 114 (H) 74 - 99 mg/dL    BUN 9 7.0 - 18 MG/DL    Creatinine 1.06 0.6 - 1.3 MG/DL    BUN/Creatinine ratio 8 (L) 12 - 20      GFR est AA >60 >60 ml/min/1.73m2    GFR est non-AA >60 >60 ml/min/1.73m2    Calcium 8.9 8.5 - 10.1 MG/DL    Bilirubin, total 0.7 0.2 - 1.0 MG/DL    ALT (SGPT) 13 (L) 16 - 61 U/L    AST (SGOT) 26 15 - 37 U/L    Alk.  phosphatase 121 (H) 45 - 117 U/L    Protein, total 7.6 6.4 - 8.2 g/dL    Albumin 3.7 3.4 - 5.0 g/dL    Globulin 3.9 2.0 - 4.0 g/dL    A-G Ratio 0.9 0.8 - 1.7     CARDIAC PANEL,(CK, CKMB & TROPONIN)    Collection Time: 02/24/18 10:56 PM   Result Value Ref Range     39 - 308 U/L    CK - MB <1.0 <3.6 ng/ml    CK-MB Index  0.0 - 4.0 %     CALCULATION NOT PERFORMED WHEN RESULT IS BELOW LINEAR LIMIT    Troponin-I, Qt. <0.02 0.0 - 0.045 NG/ML   INFLUENZA A & B AG (RAPID TEST)    Collection Time: 02/24/18 10:59 PM   Result Value Ref Range    Influenza A Antigen NEGATIVE  NEG      Influenza B Antigen NEGATIVE  NEG     URINALYSIS W/ RFLX MICROSCOPIC    Collection Time: 02/24/18 11:18 PM   Result Value Ref Range    Color DARK YELLOW      Appearance CLEAR      Specific gravity >1.030 (H) 1.005 - 1.030    pH (UA) 6.0 5.0 - 8.0      Protein 30 (A) NEG mg/dL    Glucose NEGATIVE  NEG mg/dL    Ketone TRACE (A) NEG mg/dL    Bilirubin SMALL (A) NEG      Blood NEGATIVE  NEG      Urobilinogen 2.0 (H) 0.2 - 1.0 EU/dL    Nitrites NEGATIVE  NEG      Leukocyte Esterase NEGATIVE  NEG     URINE MICROSCOPIC ONLY    Collection Time: 02/24/18 11:18 PM   Result Value Ref Range    WBC 0 to 3 0 - 5 /hpf    RBC 0 to 3 0 - 5 /hpf    Epithelial cells 2+ 0 - 5 /lpf    Bacteria NEGATIVE  NEG /hpf    Mucus 2+ (A) NEG /lpf   POC LACTIC ACID    Collection Time: 02/25/18 12:30 AM   Result Value Ref Range    Lactic Acid (POC) 1.2 0.4 - 2.0 mmol/L       Radiologic Studies -   XR CHEST SNGL V    (Results Pending)     XR Chest preliminary reading done by Dr. Marva Alvarez MD; pt xray is negative. Medical Decision Making   I am the first provider for this patient. I reviewed the vital signs, available nursing notes, past medical history, past surgical history, family history and social history. Vital Signs-Reviewed the patient's vital signs. EKG: Interpreted by the EP. Time Interpreted: 2236   Rate: 122   Rhythm: Sinus Tachycardia    Interpretation:No STEMI    Records Reviewed: Nursing Notes and Old Medical Records (Time of Review: 10:36 PM)      Provider Notes (Medical Decision Making): Pt with likely viral syndrome, improved after fluids, tylenol. No signs of serious infection. Gave pt strict return precautions. Diagnosis     Clinical Impression:   1. Fever, unspecified fever cause        Disposition: D/C home    Follow-up Information     Follow up With Details Comments [address], NP Schedule an appointment as soon as possible for a visit   [address]  [phone]      [facility] EMERGENCY DEPT  If symptoms worsen [address]  [phone]           Patient's Medications   Start Taking    No medications on file   Continue Taking    AMITRIPTYLINE (ELAVIL) 25 MG TABLET    Take 0.5 Tabs by mouth nightly.  For migraine prevention    DEXLANSOPRAZOLE (DEXILANT) 30 MG CAPSULE    Take 1 Cap by mouth daily as needed. For reflux    ESOMEPRAZOLE (NEXIUM) 40 MG CAPSULE    Take 1 Cap by mouth daily as needed. For reflux    EZETIMIBE-SIMVASTATIN (VYTORIN) 10-10 MG PER TABLET    Take 1 Tab by mouth nightly. For cholesterol    LISINOPRIL-HYDROCHLOROTHIAZIDE (PRINZIDE, ZESTORETIC) 20-12.5 MG PER TABLET    Take 1 Tab by mouth daily. For blood pressure    OMEGA-3 ACID ETHYL ESTERS (LOVAZA) 1 GRAM CAPSULE    Take 2 Caps by mouth two (2) times a day. For triglycerides    SAXAGLIPTIN-METFORMIN (KOMBIGLYZE XR) 2.5-1,000 MG TM24    Take 1 Tab by mouth daily. For diabetes    TERBINAFINE HCL (LAMISIL) 250 MG TABLET    Take 1 Tab by mouth daily. For foot fungus   These Medications have changed    No medications on file   Stop Taking    No medications on file     _______________________________    Attestations:  [address] [facility] acting as a scribe for and in the presence of Yvonne Johnson MD      February 24, 2018 at 10:36 PM       Provider Attestation:      I personally performed the services described in the documentation, reviewed the documentation, as recorded by the scribe in my presence, and it accurately and completely records my words and actions.  February 24, 2018 at 10:36 PM - Yvonne Johnson MD    _______________________________

## 2018-02-25 NOTE — DISCHARGE INSTRUCTIONS
Viral Infections: Care Instructions  Your Care Instructions    You don't feel well, but it's not clear what's causing it. You may have a viral infection. Viruses cause many illnesses, such as the common cold, influenza, fever, rashes, and the diarrhea, nausea, and vomiting that are often called \"stomach flu. \" You may wonder if antibiotic medicines could make you feel better. But antibiotics only treat infections caused by bacteria. They don't work on viruses. The good news is that viral infections usually aren't serious. Most will go away in a few days without medical treatment. In the meantime, there are a few things you can do to make yourself more comfortable. Follow-up care is a key part of your treatment and safety. Be sure to make and go to all appointments, and call your doctor if you are having problems. It's also a good idea to know your test results and keep a list of the medicines you take. How can you care for yourself at home? · Get plenty of rest if you feel tired. · Take an over-the-counter pain medicine if needed, such as acetaminophen (Tylenol), ibuprofen (Advil, Motrin), or naproxen (Aleve). Read and follow all instructions on the label. · Be careful when taking over-the-counter cold or flu medicines and Tylenol at the same time. Many of these medicines have acetaminophen, which is Tylenol. Read the labels to make sure that you are not taking more than the recommended dose. Too much acetaminophen (Tylenol) can be harmful. · Drink plenty of fluids, enough so that your urine is light yellow or clear like water. If you have kidney, heart, or liver disease and have to limit fluids, talk with your doctor before you increase the amount of fluids you drink. · Stay home from work, school, and other public places while you have a fever. When should you call for help? Call 911 anytime you think you may need emergency care. For example, call if:  ? · You have severe trouble breathing.    ? · You passed out (lost consciousness). ?Call your doctor now or seek immediate medical care if:  ? · You seem to be getting much sicker. ? · You have a new or higher fever. ? · You have blood in your stools. ? · You have new belly pain, or your pain gets worse. ? · You have a new rash. ? Watch closely for changes in your health, and be sure to contact your doctor if:  ? · You start to get better and then get worse. ? · You do not get better as expected. Where can you learn more? Go to http://judith-alex.info/. Enter J421 in the search box to learn more about \"Viral Infections: Care Instructions. \"  Current as of: March 3, 2017  Content Version: 11.4  © 4755-1877 [employer]. Care instructions adapted under license by [employer] (which disclaims liability or warranty for this information). If you have questions about a medical condition or this instruction, always ask your healthcare professional. [address] any warranty or liability for your use of this information.

## 2018-03-03 LAB
BACTERIA SPEC CULT: NORMAL
SERVICE CMNT-IMP: NORMAL

## 2018-05-17 ENCOUNTER — TELEPHONE (OUTPATIENT)
Dept: FAMILY MEDICINE CLINIC | Age: 38
End: 2018-05-17

## 2018-05-17 NOTE — TELEPHONE ENCOUNTER
Medication: Dexilant 30mg  , dose: 1 tab, how often: every day as needed , current number of medication days provided: 90, refill per application. Lot #: [id number], EXP 05/2019. This medication was received and verified for the following 1. Correct Patient, 2. Correct Diagnosis, 3. Correct Drug, 4. Correct route, and no current allergy to medication. Please contact patient to come  their medications.      Emiliano Gusman MSN,RN,[facility]

## 2018-07-12 ENCOUNTER — DOCUMENTATION ONLY (OUTPATIENT)
Dept: FAMILY MEDICINE CLINIC | Age: 38
End: 2018-07-12

## 2023-07-15 NOTE — PROGRESS NOTES
Patient here for DM diet discussion and changes he has implemented since last week. Pt did not write meals down, is still eating only one meal for most part and did not bring meter although states his AM sugar has not read above 145. He requests to keep coming however \"to stay connected. I want to stay on the right track with this. I can't stay cause I got to go to [place] and  my girlfriend, we only have one car\". He does state that he is getting more consistent sleep and this helping some. He is going out some \"with the guys and kicking it, having a few beers, but I leave before it gets crazy\". I have cautioned him on putting himself in these situations with old friends who are still using and voiced concern about ETOH use with DM. He is asked to write down BS or bring meter to next appointment. He states he is running out of strips and is encouraged to get strips at [facility].
Opt out

## 2024-09-23 NOTE — TELEPHONE ENCOUNTER
Medication: Lovaza 1gm, dose: 2 caps, how often: bid, current number of medication days provided: 90, refill per application. Lot #: [id number], EXP 11/2018. This medication was received and verified for the following 1. Correct Patient, 2. Correct Diagnosis, 3. Correct Drug, 4. Correct route, and no current allergy to medication. Medication: Kombiglyze 2.5/1000, dose: 1 tab, how often: qd , current number of medication days provided: 90, refill per application. Lot #: #[id number], EXP 11/2018. This medication was received and verified for the following 1. Correct Patient, 2. Correct Diagnosis, 3. Correct Drug, 4. Correct route, and no current allergy to medication. Please contact patient to come  their medications.      Manuel Baum, MSN,RN,[facility], [facility] correct Kombiglyze label to read for \"diabetes\" thank you
PATIENT CALLED TO  MEDICATION
PLEASE VERIFY THESE ARE THE CORRECT MEDICATIONS FOR THIS PATIENT
Allergic reaction